# Patient Record
Sex: MALE | Race: OTHER | NOT HISPANIC OR LATINO | URBAN - METROPOLITAN AREA
[De-identification: names, ages, dates, MRNs, and addresses within clinical notes are randomized per-mention and may not be internally consistent; named-entity substitution may affect disease eponyms.]

---

## 2023-07-01 ENCOUNTER — EMERGENCY (EMERGENCY)
Facility: HOSPITAL | Age: 53
LOS: 1 days | Discharge: HOME | End: 2023-07-01
Attending: EMERGENCY MEDICINE
Payer: MEDICAID

## 2023-07-01 VITALS
DIASTOLIC BLOOD PRESSURE: 79 MMHG | TEMPERATURE: 99 F | HEART RATE: 81 BPM | OXYGEN SATURATION: 98 % | RESPIRATION RATE: 19 BRPM | SYSTOLIC BLOOD PRESSURE: 114 MMHG

## 2023-07-01 VITALS
OXYGEN SATURATION: 98 % | DIASTOLIC BLOOD PRESSURE: 93 MMHG | RESPIRATION RATE: 18 BRPM | WEIGHT: 125 LBS | HEIGHT: 69 IN | SYSTOLIC BLOOD PRESSURE: 148 MMHG | TEMPERATURE: 98 F | HEART RATE: 104 BPM

## 2023-07-01 LAB
ALBUMIN SERPL ELPH-MCNC: 4.7 G/DL — SIGNIFICANT CHANGE UP (ref 3.3–5)
ALP SERPL-CCNC: 68 U/L — SIGNIFICANT CHANGE UP (ref 40–120)
ALT FLD-CCNC: 53 U/L — HIGH (ref 10–45)
ANION GAP SERPL CALC-SCNC: 12 MMOL/L — SIGNIFICANT CHANGE UP (ref 5–17)
AST SERPL-CCNC: 53 U/L — HIGH (ref 10–40)
BASOPHILS # BLD AUTO: 0.03 K/UL — SIGNIFICANT CHANGE UP (ref 0–0.2)
BASOPHILS NFR BLD AUTO: 0.6 % — SIGNIFICANT CHANGE UP (ref 0–2)
BILIRUB SERPL-MCNC: 0.8 MG/DL — SIGNIFICANT CHANGE UP (ref 0.2–1.2)
BUN SERPL-MCNC: 10 MG/DL — SIGNIFICANT CHANGE UP (ref 7–23)
CALCIUM SERPL-MCNC: 10 MG/DL — SIGNIFICANT CHANGE UP (ref 8.4–10.5)
CHLORIDE SERPL-SCNC: 99 MMOL/L — SIGNIFICANT CHANGE UP (ref 96–108)
CO2 SERPL-SCNC: 28 MMOL/L — SIGNIFICANT CHANGE UP (ref 22–31)
CREAT SERPL-MCNC: 0.85 MG/DL — SIGNIFICANT CHANGE UP (ref 0.5–1.3)
EGFR: 105 ML/MIN/1.73M2 — SIGNIFICANT CHANGE UP
EOSINOPHIL # BLD AUTO: 0.15 K/UL — SIGNIFICANT CHANGE UP (ref 0–0.5)
EOSINOPHIL NFR BLD AUTO: 3.2 % — SIGNIFICANT CHANGE UP (ref 0–6)
GLUCOSE SERPL-MCNC: 89 MG/DL — SIGNIFICANT CHANGE UP (ref 70–99)
HCT VFR BLD CALC: 42 % — SIGNIFICANT CHANGE UP (ref 39–50)
HGB BLD-MCNC: 14.6 G/DL — SIGNIFICANT CHANGE UP (ref 13–17)
IMM GRANULOCYTES NFR BLD AUTO: 0.2 % — SIGNIFICANT CHANGE UP (ref 0–0.9)
LYMPHOCYTES # BLD AUTO: 1.37 K/UL — SIGNIFICANT CHANGE UP (ref 1–3.3)
LYMPHOCYTES # BLD AUTO: 29 % — SIGNIFICANT CHANGE UP (ref 13–44)
MCHC RBC-ENTMCNC: 31.1 PG — SIGNIFICANT CHANGE UP (ref 27–34)
MCHC RBC-ENTMCNC: 34.8 GM/DL — SIGNIFICANT CHANGE UP (ref 32–36)
MCV RBC AUTO: 89.4 FL — SIGNIFICANT CHANGE UP (ref 80–100)
MONOCYTES # BLD AUTO: 0.63 K/UL — SIGNIFICANT CHANGE UP (ref 0–0.9)
MONOCYTES NFR BLD AUTO: 13.3 % — SIGNIFICANT CHANGE UP (ref 2–14)
NEUTROPHILS # BLD AUTO: 2.53 K/UL — SIGNIFICANT CHANGE UP (ref 1.8–7.4)
NEUTROPHILS NFR BLD AUTO: 53.7 % — SIGNIFICANT CHANGE UP (ref 43–77)
NRBC # BLD: 0 /100 WBCS — SIGNIFICANT CHANGE UP (ref 0–0)
PLATELET # BLD AUTO: 335 K/UL — SIGNIFICANT CHANGE UP (ref 150–400)
POTASSIUM SERPL-MCNC: 4.3 MMOL/L — SIGNIFICANT CHANGE UP (ref 3.5–5.3)
POTASSIUM SERPL-SCNC: 4.3 MMOL/L — SIGNIFICANT CHANGE UP (ref 3.5–5.3)
PROT SERPL-MCNC: 7.4 G/DL — SIGNIFICANT CHANGE UP (ref 6–8.3)
RBC # BLD: 4.7 M/UL — SIGNIFICANT CHANGE UP (ref 4.2–5.8)
RBC # FLD: 12.4 % — SIGNIFICANT CHANGE UP (ref 10.3–14.5)
SODIUM SERPL-SCNC: 139 MMOL/L — SIGNIFICANT CHANGE UP (ref 135–145)
WBC # BLD: 4.72 K/UL — SIGNIFICANT CHANGE UP (ref 3.8–10.5)
WBC # FLD AUTO: 4.72 K/UL — SIGNIFICANT CHANGE UP (ref 3.8–10.5)

## 2023-07-01 PROCEDURE — 85025 COMPLETE CBC W/AUTO DIFF WBC: CPT

## 2023-07-01 PROCEDURE — 80053 COMPREHEN METABOLIC PANEL: CPT

## 2023-07-01 PROCEDURE — 73206 CT ANGIO UPR EXTRM W/O&W/DYE: CPT | Mod: MA

## 2023-07-01 PROCEDURE — 99285 EMERGENCY DEPT VISIT HI MDM: CPT

## 2023-07-01 PROCEDURE — 99284 EMERGENCY DEPT VISIT MOD MDM: CPT | Mod: 25

## 2023-07-01 PROCEDURE — 73206 CT ANGIO UPR EXTRM W/O&W/DYE: CPT | Mod: 26,RT,MA

## 2023-07-01 PROCEDURE — 36415 COLL VENOUS BLD VENIPUNCTURE: CPT

## 2023-07-01 NOTE — ED PROVIDER NOTE - NSFOLLOWUPCLINICS_GEN_ALL_ED_FT
[facility] Dermatol[facility]  Dermatology  [address], [facility] [address]  [place] [address]  Phone: [phone]  Fax:     [facility]  General Surgery  [address] [place] [address]  Phone: [phone]  Fax:

## 2023-07-01 NOTE — ED ADULT NURSE NOTE - OBJECTIVE STATEMENT
Patient  is  alert  and  oriented x3.  Color is good and skin warm to touch. Redness, swelling and hematoma noted to  left inner forearm.  Patient  denies any fall or injury.

## 2023-07-01 NOTE — ED PROVIDER NOTE - PROGRESS NOTE DETAILS
Salome Horne, PGY-2 DO:  CT concerning for possibly cystic /solid mass. Patient informed of findings. Patient to be d/c'd with f/u with derm and surgery. Patient is agreeable to plan. Patient instructed to take Tylenol and Motrin for symptoms.

## 2023-07-01 NOTE — ED PROVIDER NOTE - OBJECTIVE STATEMENT
52-year-old male no known medical history presenting with right forearm mass.  Patient states he noticed mass for about 1 year.  Started off small but has gradually been increasing.  Patient started getting pain 1 week ago.  Patient states he is having pain radiating down the right forearm and up the right arm.  Patient states pain is constant.  Has tried Tylenol with minimal symptom relief.  Patient describes pain as achy.  States he has tingling in his finger and occasionally gets twitches  of the forearm.  The patient denies any trauma to the region.  Patient denies nausea, vomiting, fevers, chills, chest pain, shortness of breath, headache or visual changes.

## 2023-07-01 NOTE — ED PROVIDER NOTE - ATTENDING CONTRIBUTION TO CARE
RGUJRAL 52-year-old male no past medical history presents with right forearm mass.  Patient states he has noticed a small lesion for about a year that has grown since.  Denies any trauma or drug abuse.  Patient states recently his pain now radiating down and up to his arm with pain and intermittent tingling.  Patient has not been evaluated for this lesion prior.  On exam patient is well-appearing no acute distress nontender shoulder or elbow.  Right forearm volar aspect proximal forearm with 5 x 5 cm raised questionable cystic lesion no overlying cellulitis or erythema non tender.  Positive radial pulse normal sensation.  5 out of 5 strength.  Will obtain ultrasound and CAT scan to evaluate mass.  Patient declined pain meds.  Continue to monitor.

## 2023-07-01 NOTE — ED PROVIDER NOTE - NSFOLLOWUPINSTRUCTIONS_ED_ALL_ED_FT
Today you were evaluated for a mass on your right forearm. The CT scans were inconclusive on the specific type of mass so you will need to follow up with another physician for further evaluation.     Please follow up with a dermatologist or general surgeon within the next week.     Return to the ER for any new or concerning symptoms such as mass getting larger, pain that is not controlled with medication or loss of sensation in the arm or hand.     You may take 650 mg acetaminophen every eight hours or 600 mg Motrin every six hours as needed for pain.

## 2023-07-01 NOTE — ED ADULT NURSE NOTE - NURSING MUSC JOINTS
Quality 226: Preventive Care And Screening: Tobacco Use: Screening And Cessation Intervention: Tobacco Screening not Performed for Unknown Reasons
no pain, swelling or deformity of joints
Detail Level: Detailed

## 2023-07-01 NOTE — ED ADULT NURSE NOTE - NSFALLUNIVINTERV_ED_ALL_ED
Bed/Stretcher in lowest position, wheels locked, appropriate side rails in place/Call bell, personal items and telephone in reach/Instruct patient to call for assistance before getting out of bed/chair/stretcher/Non-slip footwear applied when patient is off stretcher/[place] to call system/Physically safe environment - no spills, clutter or unnecessary equipment/Purposeful proactive rounding/Room/bathroom lighting operational, light cord in reach

## 2023-07-01 NOTE — ED PROVIDER NOTE - CLINICAL SUMMARY MEDICAL DECISION MAKING FREE TEXT BOX
Differential is not limited to vascular malformation, complex cyst, lipoma, malignant mass low concern for abscess and cellulitis.  Plan to get CTA of right arm.  Patient is denying pain control at this time.  Dispo pending imaging and reassessment.

## 2023-07-01 NOTE — ED PROVIDER NOTE - PATIENT PORTAL LINK FT
You can access the FollowMyHealth Patient Portal offered by [facility] by registering at the following website: [email or web address]. By joining [employer]’s FollowMyHealth portal, you will also be able to view your health information using other applications (apps) compatible with our system.

## 2023-07-01 NOTE — ED PROVIDER NOTE - PHYSICAL EXAMINATION
GENERAL: Awake, alert, NAD  HEENT: NC/AT, moist mucous membranes, PERRL, EOMI  LUNGS: CTAB, no wheezes or crackles   CARDIAC: RRR, no m/r/g  ABDOMEN: Soft, non tender, non distended, no rebound, no guarding  BACK: No midline spinal tenderness, no CVA tenderness  EXT: No edema, no calf tenderness, 2+ DP pulses bilaterally, no deformities.  NEURO: A&Ox3. Moving all extremities.  SKIN: +right forearm mass, TTP of mass, Mobile, soft, vascular and elevated ~3cm. Not Warm or erythematous.   PSYCH: Normal affect.

## 2023-07-06 PROBLEM — Z00.00 ENCOUNTER FOR PREVENTIVE HEALTH EXAMINATION: Status: ACTIVE | Noted: 2023-07-06

## 2023-07-10 ENCOUNTER — APPOINTMENT (OUTPATIENT)
Dept: SURGERY | Facility: HOSPITAL | Age: 53
End: 2023-07-10
Payer: MEDICAID

## 2023-07-10 ENCOUNTER — OUTPATIENT (OUTPATIENT)
Dept: OUTPATIENT SERVICES | Facility: HOSPITAL | Age: 53
LOS: 1 days | End: 2023-07-10
Payer: MEDICAID

## 2023-07-10 VITALS
DIASTOLIC BLOOD PRESSURE: 86 MMHG | TEMPERATURE: 96.8 F | BODY MASS INDEX: 17.03 KG/M2 | WEIGHT: 115 LBS | SYSTOLIC BLOOD PRESSURE: 126 MMHG | HEART RATE: 73 BPM | HEIGHT: 69 IN

## 2023-07-10 DIAGNOSIS — L72.9 FOLLICULAR CYST OF THE SKIN AND SUBCUTANEOUS TISSUE, UNSPECIFIED: ICD-10-CM

## 2023-07-10 DIAGNOSIS — R22.31 LOCALIZED SWELLING, MASS AND LUMP, RIGHT UPPER LIMB: ICD-10-CM

## 2023-07-10 DIAGNOSIS — L72.3 SEBACEOUS CYST: ICD-10-CM

## 2023-07-10 PROCEDURE — 99202 OFFICE O/P NEW SF 15 MIN: CPT

## 2023-07-10 PROCEDURE — G0463: CPT

## 2023-08-12 ENCOUNTER — OUTPATIENT (OUTPATIENT)
Dept: OUTPATIENT SERVICES | Facility: HOSPITAL | Age: 53
LOS: 1 days | End: 2023-08-12

## 2023-08-12 DIAGNOSIS — Z00.8 ENCOUNTER FOR OTHER GENERAL EXAMINATION: ICD-10-CM

## 2023-09-17 ENCOUNTER — APPOINTMENT (OUTPATIENT)
Dept: MRI IMAGING | Facility: IMAGING CENTER | Age: 53
End: 2023-09-17

## 2023-09-17 ENCOUNTER — OUTPATIENT (OUTPATIENT)
Dept: OUTPATIENT SERVICES | Facility: HOSPITAL | Age: 53
LOS: 1 days | End: 2023-09-17

## 2023-09-17 DIAGNOSIS — Z00.8 ENCOUNTER FOR OTHER GENERAL EXAMINATION: ICD-10-CM

## 2023-09-17 DIAGNOSIS — L72.9 FOLLICULAR CYST OF THE SKIN AND SUBCUTANEOUS TISSUE, UNSPECIFIED: ICD-10-CM

## 2024-02-25 ENCOUNTER — EMERGENCY (EMERGENCY)
Facility: HOSPITAL | Age: 54
LOS: 1 days | Discharge: HOME | End: 2024-02-25
Attending: EMERGENCY MEDICINE
Payer: COMMERCIAL

## 2024-02-25 VITALS
OXYGEN SATURATION: 97 % | SYSTOLIC BLOOD PRESSURE: 125 MMHG | RESPIRATION RATE: 16 BRPM | DIASTOLIC BLOOD PRESSURE: 86 MMHG | HEART RATE: 92 BPM | TEMPERATURE: 98 F

## 2024-02-25 VITALS
RESPIRATION RATE: 16 BRPM | OXYGEN SATURATION: 97 % | DIASTOLIC BLOOD PRESSURE: 88 MMHG | HEART RATE: 120 BPM | TEMPERATURE: 98 F | HEIGHT: 69 IN | SYSTOLIC BLOOD PRESSURE: 137 MMHG | WEIGHT: 119.93 LBS

## 2024-02-25 LAB
ALBUMIN SERPL ELPH-MCNC: 4.3 G/DL — SIGNIFICANT CHANGE UP (ref 3.3–5)
ALP SERPL-CCNC: 74 U/L — SIGNIFICANT CHANGE UP (ref 40–120)
ALT FLD-CCNC: 26 U/L — SIGNIFICANT CHANGE UP (ref 10–45)
ANION GAP SERPL CALC-SCNC: 10 MMOL/L — SIGNIFICANT CHANGE UP (ref 5–17)
APTT BLD: 28.6 SEC — SIGNIFICANT CHANGE UP (ref 24.5–35.6)
AST SERPL-CCNC: 30 U/L — SIGNIFICANT CHANGE UP (ref 10–40)
BASOPHILS # BLD AUTO: 0.03 K/UL — SIGNIFICANT CHANGE UP (ref 0–0.2)
BASOPHILS NFR BLD AUTO: 0.7 % — SIGNIFICANT CHANGE UP (ref 0–2)
BILIRUB SERPL-MCNC: 0.4 MG/DL — SIGNIFICANT CHANGE UP (ref 0.2–1.2)
BUN SERPL-MCNC: 7 MG/DL — SIGNIFICANT CHANGE UP (ref 7–23)
CALCIUM SERPL-MCNC: 9.2 MG/DL — SIGNIFICANT CHANGE UP (ref 8.4–10.5)
CHLORIDE SERPL-SCNC: 102 MMOL/L — SIGNIFICANT CHANGE UP (ref 96–108)
CK MB CFR SERPL CALC: <1 NG/ML — SIGNIFICANT CHANGE UP (ref 0–6.7)
CK SERPL-CCNC: 77 U/L — SIGNIFICANT CHANGE UP (ref 30–200)
CO2 SERPL-SCNC: 27 MMOL/L — SIGNIFICANT CHANGE UP (ref 22–31)
CREAT SERPL-MCNC: 0.78 MG/DL — SIGNIFICANT CHANGE UP (ref 0.5–1.3)
D DIMER BLD IA.RAPID-MCNC: <150 NG/ML DDU — SIGNIFICANT CHANGE UP
EGFR: 107 ML/MIN/1.73M2 — SIGNIFICANT CHANGE UP
EOSINOPHIL # BLD AUTO: 0.04 K/UL — SIGNIFICANT CHANGE UP (ref 0–0.5)
EOSINOPHIL NFR BLD AUTO: 0.9 % — SIGNIFICANT CHANGE UP (ref 0–6)
FLUAV AG NPH QL: SIGNIFICANT CHANGE UP
FLUBV AG NPH QL: SIGNIFICANT CHANGE UP
GAS PNL BLDV: SIGNIFICANT CHANGE UP
GLUCOSE SERPL-MCNC: 126 MG/DL — HIGH (ref 70–99)
HCT VFR BLD CALC: 40.6 % — SIGNIFICANT CHANGE UP (ref 39–50)
HGB BLD-MCNC: 14.2 G/DL — SIGNIFICANT CHANGE UP (ref 13–17)
IMM GRANULOCYTES NFR BLD AUTO: 0.2 % — SIGNIFICANT CHANGE UP (ref 0–0.9)
INR BLD: 0.88 RATIO — SIGNIFICANT CHANGE UP (ref 0.85–1.18)
LYMPHOCYTES # BLD AUTO: 1.85 K/UL — SIGNIFICANT CHANGE UP (ref 1–3.3)
LYMPHOCYTES # BLD AUTO: 40.9 % — SIGNIFICANT CHANGE UP (ref 13–44)
MAGNESIUM SERPL-MCNC: 1.8 MG/DL — SIGNIFICANT CHANGE UP (ref 1.6–2.6)
MCHC RBC-ENTMCNC: 30.9 PG — SIGNIFICANT CHANGE UP (ref 27–34)
MCHC RBC-ENTMCNC: 35 GM/DL — SIGNIFICANT CHANGE UP (ref 32–36)
MCV RBC AUTO: 88.5 FL — SIGNIFICANT CHANGE UP (ref 80–100)
MONOCYTES # BLD AUTO: 0.55 K/UL — SIGNIFICANT CHANGE UP (ref 0–0.9)
MONOCYTES NFR BLD AUTO: 12.2 % — SIGNIFICANT CHANGE UP (ref 2–14)
NEUTROPHILS # BLD AUTO: 2.04 K/UL — SIGNIFICANT CHANGE UP (ref 1.8–7.4)
NEUTROPHILS NFR BLD AUTO: 45.1 % — SIGNIFICANT CHANGE UP (ref 43–77)
NRBC # BLD: 0 /100 WBCS — SIGNIFICANT CHANGE UP (ref 0–0)
NT-PROBNP SERPL-SCNC: <36 PG/ML — SIGNIFICANT CHANGE UP (ref 0–300)
PLATELET # BLD AUTO: 316 K/UL — SIGNIFICANT CHANGE UP (ref 150–400)
POTASSIUM SERPL-MCNC: 3.4 MMOL/L — LOW (ref 3.5–5.3)
POTASSIUM SERPL-SCNC: 3.4 MMOL/L — LOW (ref 3.5–5.3)
PROT SERPL-MCNC: 6.9 G/DL — SIGNIFICANT CHANGE UP (ref 6–8.3)
PROTHROM AB SERPL-ACNC: 9.7 SEC — SIGNIFICANT CHANGE UP (ref 9.5–13)
RBC # BLD: 4.59 M/UL — SIGNIFICANT CHANGE UP (ref 4.2–5.8)
RBC # FLD: 12.9 % — SIGNIFICANT CHANGE UP (ref 10.3–14.5)
RSV RNA NPH QL NAA+NON-PROBE: SIGNIFICANT CHANGE UP
SARS-COV-2 RNA SPEC QL NAA+PROBE: SIGNIFICANT CHANGE UP
SODIUM SERPL-SCNC: 139 MMOL/L — SIGNIFICANT CHANGE UP (ref 135–145)
TROPONIN T, HIGH SENSITIVITY RESULT: <6 NG/L — SIGNIFICANT CHANGE UP (ref 0–51)
TSH SERPL-MCNC: 0.55 UIU/ML — SIGNIFICANT CHANGE UP (ref 0.27–4.2)
WBC # BLD: 4.52 K/UL — SIGNIFICANT CHANGE UP (ref 3.8–10.5)
WBC # FLD AUTO: 4.52 K/UL — SIGNIFICANT CHANGE UP (ref 3.8–10.5)

## 2024-02-25 PROCEDURE — 83735 ASSAY OF MAGNESIUM: CPT

## 2024-02-25 PROCEDURE — 85730 THROMBOPLASTIN TIME PARTIAL: CPT

## 2024-02-25 PROCEDURE — 99285 EMERGENCY DEPT VISIT HI MDM: CPT | Mod: 25

## 2024-02-25 PROCEDURE — 80053 COMPREHEN METABOLIC PANEL: CPT

## 2024-02-25 PROCEDURE — 84132 ASSAY OF SERUM POTASSIUM: CPT

## 2024-02-25 PROCEDURE — 82947 ASSAY GLUCOSE BLOOD QUANT: CPT

## 2024-02-25 PROCEDURE — 82550 ASSAY OF CK (CPK): CPT

## 2024-02-25 PROCEDURE — 82330 ASSAY OF CALCIUM: CPT

## 2024-02-25 PROCEDURE — 87637 SARSCOV2&INF A&B&RSV AMP PRB: CPT

## 2024-02-25 PROCEDURE — 85025 COMPLETE CBC W/AUTO DIFF WBC: CPT

## 2024-02-25 PROCEDURE — 71046 X-RAY EXAM CHEST 2 VIEWS: CPT | Mod: 26

## 2024-02-25 PROCEDURE — 84295 ASSAY OF SERUM SODIUM: CPT

## 2024-02-25 PROCEDURE — 71046 X-RAY EXAM CHEST 2 VIEWS: CPT

## 2024-02-25 PROCEDURE — 93005 ELECTROCARDIOGRAM TRACING: CPT

## 2024-02-25 PROCEDURE — 84443 ASSAY THYROID STIM HORMONE: CPT

## 2024-02-25 PROCEDURE — 83605 ASSAY OF LACTIC ACID: CPT

## 2024-02-25 PROCEDURE — 82435 ASSAY OF BLOOD CHLORIDE: CPT

## 2024-02-25 PROCEDURE — 85610 PROTHROMBIN TIME: CPT

## 2024-02-25 PROCEDURE — 82553 CREATINE MB FRACTION: CPT

## 2024-02-25 PROCEDURE — 94640 AIRWAY INHALATION TREATMENT: CPT

## 2024-02-25 PROCEDURE — 84484 ASSAY OF TROPONIN QUANT: CPT

## 2024-02-25 PROCEDURE — 82803 BLOOD GASES ANY COMBINATION: CPT

## 2024-02-25 PROCEDURE — 85018 HEMOGLOBIN: CPT

## 2024-02-25 PROCEDURE — 85014 HEMATOCRIT: CPT

## 2024-02-25 PROCEDURE — 99285 EMERGENCY DEPT VISIT HI MDM: CPT

## 2024-02-25 PROCEDURE — 85379 FIBRIN DEGRADATION QUANT: CPT

## 2024-02-25 PROCEDURE — 83880 ASSAY OF NATRIURETIC PEPTIDE: CPT

## 2024-02-25 RX ORDER — ALBUTEROL 90 UG/1
2 AEROSOL, METERED ORAL
Qty: 1 | Refills: 0
Start: 2024-02-25

## 2024-02-25 RX ORDER — IPRATROPIUM/ALBUTEROL SULFATE 18-103MCG
3 AEROSOL WITH ADAPTER (GRAM) INHALATION ONCE
Refills: 0 | Status: COMPLETED | OUTPATIENT
Start: 2024-02-25 | End: 2024-02-25

## 2024-02-25 RX ORDER — POTASSIUM CHLORIDE 20 MEQ
40 PACKET (EA) ORAL ONCE
Refills: 0 | Status: COMPLETED | OUTPATIENT
Start: 2024-02-25 | End: 2024-02-25

## 2024-02-25 RX ORDER — SODIUM CHLORIDE 9 MG/ML
500 INJECTION INTRAMUSCULAR; INTRAVENOUS; SUBCUTANEOUS ONCE
Refills: 0 | Status: COMPLETED | OUTPATIENT
Start: 2024-02-25 | End: 2024-02-25

## 2024-02-25 RX ADMIN — Medication 3 MILLILITER(S): at 15:00

## 2024-02-25 RX ADMIN — Medication 25 MILLIGRAM(S): at 15:05

## 2024-02-25 RX ADMIN — SODIUM CHLORIDE 500 MILLILITER(S): 9 INJECTION INTRAMUSCULAR; INTRAVENOUS; SUBCUTANEOUS at 13:21

## 2024-02-25 RX ADMIN — Medication 40 MILLIEQUIVALENT(S): at 15:00

## 2024-02-25 RX ADMIN — SODIUM CHLORIDE 500 MILLILITER(S): 9 INJECTION INTRAMUSCULAR; INTRAVENOUS; SUBCUTANEOUS at 13:43

## 2024-02-25 NOTE — ED PROVIDER NOTE - NSFOLLOWUPCLINICSTOKEN_GEN_ALL_ED_FT
174056: || ||00\01||False;[id number]: || ||00\01||False;[id number]: || ||00\01||False;691100: || ||00\01||False;

## 2024-02-25 NOTE — ED PROVIDER NOTE - NSFOLLOWUPCLINICS_GEN_ALL_ED_FT
[facility] Pulmonolgy and Sleep Medicine  Pulmonology  [address]  [place] [address]  Phone: [phone]  Fax:     [facility] [place]  Dermatology  [address]  [place] [address]  Phone: [phone]  Fax: [phone]    [facility] General Internal Medicine  General Internal Medicine  [address] [place] [address]  Phone: [phone]  Fax:     [facility] at [place]  Internal Medicine  [address] [place] [address]  Phone: [phone]  Fax: [phone]

## 2024-02-25 NOTE — ED PROVIDER NOTE - CLINICAL SUMMARY MEDICAL DECISION MAKING FREE TEXT BOX
Sanjay PGY3: 54yo M with no PCP, PMH active smoker (cig/marijuana) & alcohol use presents for eval of 'heart racing' & SOB. Differential Diagnosis includes but not limited to alcohol WD vs infectious causes including PNA/viral URI vs substance use vs underlying lung dx due to smoking. No known cardiac hx or PE risk factors. Given EKG shows sinus tachycardia with no measured fever will plan to check labs, trop/bnp, d-dimer, CXR, give gentle IVF and trend fever curve. Dispo based on results.

## 2024-02-25 NOTE — ED ADULT NURSE NOTE - OBJECTIVE STATEMENT
53y M BIB self from home p/w shortness of breath. Pt is axo4, ambulates independently at baseline. PMH active smoker (cig/marijuana) & alcohol use (vodka as liquor preference). Mentions the shortness of breath is associated with "heart racing" feeling for the last 3 days, has intermittently felt his heart race and then felt chest get tight w/ mild discomfort. Pain doesn't radiate anywhere. Denies headache, dizziness, vision changes, abdominal pain, nausea, vomiting, diarrhea, fevers, chills, dysuria, hematuria, recent illness travel or fall. Denies drug use, +alcohol 1/2 pint vodka 3-4/week. No hx of WD. Last drink 2/24 PM. Patient undressed and placed into gown, call bell in hand and side rails up with bed in lowest position for safety. blanket provided. Comfort and safety provided. Placed on cardiac monitor, sinus tachycardia.

## 2024-02-25 NOTE — ED PROVIDER NOTE - PHYSICAL EXAMINATION
CONSTITUTIONAL: thin patient, appears older than stated age, NAD.  SKIN: no rash appreciated, warm to touch. ; 1.5cm ovoid soft mobile lesion with punctate top over flexor R arm @ elbow w/o and palpable fluctuance and unable to express any materials.   HEAD: NCAT  EYES: NL inspection  ENT: mildly dry oral mucosa, mild tongue fasciculations   NECK: Supple  CARD: tachycardia, regular   RESP: lungs CTA w/o wheezing/crackles   ABD: S/NT no R/G  EXT: no LE edema  NEURO: Grossly non-focal   PSYCH: Cooperative, appropriate.

## 2024-02-25 NOTE — ED PROVIDER NOTE - OBJECTIVE STATEMENT
52yo M with no PCP, PMH active smoker (cig/marijuana) & alcohol use presents for eval of 'heart racing' & SOB. For 3d has intermittently felt his heart race and then felt chest get tight w/ mild discomfort. +orthopnea, mild nausea and dec exercise tolerance. Pain doesn't radiate anywhere. No significant cough, fever, abd pain, vomiting/diarrhea. Denies drug use, +alcohol 1/2 pint vodka 3-4/week. No hx of WD. Last drink 2/24 PM. No known lung/cardiac dx.

## 2024-02-25 NOTE — ED ADULT NURSE REASSESSMENT NOTE - NS ED NURSE REASSESS COMMENT FT1
1512 Pt given potasium as ordered pot 3.4 Pt also given Librium Pt has no active withdrawal symptoms at this time HHN   also  Delroy

## 2024-02-25 NOTE — ED PROVIDER NOTE - PATIENT PORTAL LINK FT
You can access the FollowMyHealth Patient Portal offered by [facility] by registering at the following website: [email or web address]. By joining [facility]’s FollowMyHealth portal, you will also be able to view your health information using other applications (apps) compatible with our system.

## 2024-02-25 NOTE — ED PROVIDER NOTE - ATTENDING CONTRIBUTION TO CARE
52 yo male, hx of heavy ETOH use (>14 drinks/week), active smoker, p/w intermittment palpitations/SOB.  nontoxic on exam, conversant.  EKG independently reviewed by me at the bedside, no evidence of STEMI, sinus tach noted.  chest x-ray independently reviewed by myself, no evidence of pneumothorax, mediastinum appears normal, lungs clear.  dimer negative, troponin negative.  not PE, not ACS, not dissection.  will give nebs and reassess.

## 2024-02-25 NOTE — ED PROVIDER NOTE - PROGRESS NOTE DETAILS
Sanjay PGY3: reassessed pt, HR now 80. Results non-contributory for cause of pt sxs. Pt palpitations slowed but still feels the SOB w/o change. Plan for duoneb, librium and potassium repletion and tbdc with pulm f/u. Patient agreeable to this plan. Sanjay PGY3: after neb, patient feels improved. Possibly COPD. Will dc with albuterol, 5d of steroids and pulm f/u.

## 2024-02-25 NOTE — ED PROVIDER NOTE - NSFOLLOWUPINSTRUCTIONS_ED_ALL_ED_FT
Shortness of breath    Shortness of breath (dyspnea) means you have trouble breathing and could indicate a medical problem. Causes include lung disease, heart disease, low amount of red blood cells (anemia), poor physical fitness, being overweight, smoking, etc. Your health care provider today may not be able to find a cause for your shortness of breath after your exam. In this case, it is important to have a follow-up exam with your primary care physician as instructed. Refrain from tobacco products.    - Please follow-up with a PULMONOLOGIST.    SEEK IMMEDIATE MEDICAL CARE IF YOU HAVE ANY OF THE FOLLOWING SYMPTOMS: worsening shortness of breath, chest pain, back pain, abdominal pain, fever, coughing up blood, lightheadedness/dizziness. Shortness of breath    Shortness of breath (dyspnea) means you have trouble breathing and could indicate a medical problem. Causes include lung disease, heart disease, low amount of red blood cells (anemia), poor physical fitness, being overweight, smoking, etc. Your health care provider today may not be able to find a cause for your shortness of breath after your exam. In this case, it is important to have a follow-up exam with your primary care physician as instructed. Refrain from tobacco products.    - Continue with albuterol 2 puffs every 6 hours as needed for chest tightness/shortness of breath.   - Continue with Prednisone 40mg once a day with a meal for next 5 days.    - Please follow-up with a PULMONOLOGIST.  - Please follow up with a PRIMARY CARE PROVIDER & still recommend Dermatology follow-up for cystic lesion on arm.     SEEK IMMEDIATE MEDICAL CARE IF YOU HAVE ANY OF THE FOLLOWING SYMPTOMS: worsening shortness of breath, chest pain, back pain, abdominal pain, fever, coughing up blood, lightheadedness/dizziness.

## 2024-02-25 NOTE — ED ADULT NURSE NOTE - NSFALLUNIVINTERV_ED_ALL_ED
Bed/Stretcher in lowest position, wheels locked, appropriate side rails in place/Call bell, personal items and telephone in reach/Instruct patient to call for assistance before getting out of bed/chair/stretcher/Non-slip footwear applied when patient is off stretcher/[place] to call system/Physically safe environment - no spills, clutter or unnecessary equipment/Purposeful proactive rounding/Room/bathroom lighting operational, light cord in reach

## 2024-05-17 ENCOUNTER — INPATIENT (INPATIENT)
Facility: HOSPITAL | Age: 54
LOS: 3 days | Discharge: HOME | DRG: 897 | End: 2024-05-21
Attending: STUDENT IN AN ORGANIZED HEALTH CARE EDUCATION/TRAINING PROGRAM | Admitting: STUDENT IN AN ORGANIZED HEALTH CARE EDUCATION/TRAINING PROGRAM
Payer: MEDICAID

## 2024-05-17 VITALS
OXYGEN SATURATION: 97 % | HEART RATE: 104 BPM | HEIGHT: 69 IN | RESPIRATION RATE: 18 BRPM | WEIGHT: 125 LBS | SYSTOLIC BLOOD PRESSURE: 145 MMHG | DIASTOLIC BLOOD PRESSURE: 97 MMHG | TEMPERATURE: 98 F

## 2024-05-17 LAB
ALBUMIN SERPL ELPH-MCNC: 4.6 G/DL — SIGNIFICANT CHANGE UP (ref 3.3–5)
ALP SERPL-CCNC: 63 U/L — SIGNIFICANT CHANGE UP (ref 40–120)
ALT FLD-CCNC: 30 U/L — SIGNIFICANT CHANGE UP (ref 10–45)
ANION GAP SERPL CALC-SCNC: 18 MMOL/L — HIGH (ref 5–17)
APAP SERPL-MCNC: <15 UG/ML — SIGNIFICANT CHANGE UP (ref 10–30)
AST SERPL-CCNC: 24 U/L — SIGNIFICANT CHANGE UP (ref 10–40)
BASOPHILS # BLD AUTO: 0.04 K/UL — SIGNIFICANT CHANGE UP (ref 0–0.2)
BASOPHILS NFR BLD AUTO: 0.8 % — SIGNIFICANT CHANGE UP (ref 0–2)
BILIRUB SERPL-MCNC: 0.3 MG/DL — SIGNIFICANT CHANGE UP (ref 0.2–1.2)
BUN SERPL-MCNC: 7 MG/DL — SIGNIFICANT CHANGE UP (ref 7–23)
CALCIUM SERPL-MCNC: 9.5 MG/DL — SIGNIFICANT CHANGE UP (ref 8.4–10.5)
CHLORIDE SERPL-SCNC: 104 MMOL/L — SIGNIFICANT CHANGE UP (ref 96–108)
CO2 SERPL-SCNC: 22 MMOL/L — SIGNIFICANT CHANGE UP (ref 22–31)
CREAT SERPL-MCNC: 0.81 MG/DL — SIGNIFICANT CHANGE UP (ref 0.5–1.3)
EGFR: 106 ML/MIN/1.73M2 — SIGNIFICANT CHANGE UP
EOSINOPHIL # BLD AUTO: 0.06 K/UL — SIGNIFICANT CHANGE UP (ref 0–0.5)
EOSINOPHIL NFR BLD AUTO: 1.3 % — SIGNIFICANT CHANGE UP (ref 0–6)
ETHANOL SERPL-MCNC: 279 MG/DL — HIGH (ref 0–10)
GLUCOSE SERPL-MCNC: 83 MG/DL — SIGNIFICANT CHANGE UP (ref 70–99)
HCT VFR BLD CALC: 48 % — SIGNIFICANT CHANGE UP (ref 39–50)
HGB BLD-MCNC: 16.6 G/DL — SIGNIFICANT CHANGE UP (ref 13–17)
HIV 1 & 2 AB SERPL IA.RAPID: SIGNIFICANT CHANGE UP
IMM GRANULOCYTES NFR BLD AUTO: 0.2 % — SIGNIFICANT CHANGE UP (ref 0–0.9)
LYMPHOCYTES # BLD AUTO: 2.07 K/UL — SIGNIFICANT CHANGE UP (ref 1–3.3)
LYMPHOCYTES # BLD AUTO: 43.1 % — SIGNIFICANT CHANGE UP (ref 13–44)
MAGNESIUM SERPL-MCNC: 2.2 MG/DL — SIGNIFICANT CHANGE UP (ref 1.6–2.6)
MCHC RBC-ENTMCNC: 31.1 PG — SIGNIFICANT CHANGE UP (ref 27–34)
MCHC RBC-ENTMCNC: 34.6 GM/DL — SIGNIFICANT CHANGE UP (ref 32–36)
MCV RBC AUTO: 90.1 FL — SIGNIFICANT CHANGE UP (ref 80–100)
MONOCYTES # BLD AUTO: 0.46 K/UL — SIGNIFICANT CHANGE UP (ref 0–0.9)
MONOCYTES NFR BLD AUTO: 9.6 % — SIGNIFICANT CHANGE UP (ref 2–14)
NEUTROPHILS # BLD AUTO: 2.16 K/UL — SIGNIFICANT CHANGE UP (ref 1.8–7.4)
NEUTROPHILS NFR BLD AUTO: 45 % — SIGNIFICANT CHANGE UP (ref 43–77)
NRBC # BLD: 0 /100 WBCS — SIGNIFICANT CHANGE UP (ref 0–0)
PHOSPHATE SERPL-MCNC: 3.6 MG/DL — SIGNIFICANT CHANGE UP (ref 2.5–4.5)
PLATELET # BLD AUTO: 316 K/UL — SIGNIFICANT CHANGE UP (ref 150–400)
POTASSIUM SERPL-MCNC: 3.6 MMOL/L — SIGNIFICANT CHANGE UP (ref 3.5–5.3)
POTASSIUM SERPL-SCNC: 3.6 MMOL/L — SIGNIFICANT CHANGE UP (ref 3.5–5.3)
PROT SERPL-MCNC: 7.5 G/DL — SIGNIFICANT CHANGE UP (ref 6–8.3)
RBC # BLD: 5.33 M/UL — SIGNIFICANT CHANGE UP (ref 4.2–5.8)
RBC # FLD: 14 % — SIGNIFICANT CHANGE UP (ref 10.3–14.5)
SALICYLATES SERPL-MCNC: <2 MG/DL — LOW (ref 15–30)
SODIUM SERPL-SCNC: 144 MMOL/L — SIGNIFICANT CHANGE UP (ref 135–145)
WBC # BLD: 4.8 K/UL — SIGNIFICANT CHANGE UP (ref 3.8–10.5)
WBC # FLD AUTO: 4.8 K/UL — SIGNIFICANT CHANGE UP (ref 3.8–10.5)

## 2024-05-17 PROCEDURE — 72125 CT NECK SPINE W/O DYE: CPT | Mod: 26,MC

## 2024-05-17 PROCEDURE — 99291 CRITICAL CARE FIRST HOUR: CPT | Mod: 25

## 2024-05-17 PROCEDURE — 71045 X-RAY EXAM CHEST 1 VIEW: CPT | Mod: 26

## 2024-05-17 PROCEDURE — 12013 RPR F/E/E/N/L/M 2.6-5.0 CM: CPT

## 2024-05-17 PROCEDURE — 70450 CT HEAD/BRAIN W/O DYE: CPT | Mod: 26,MC

## 2024-05-17 PROCEDURE — 99292 CRITICAL CARE ADDL 30 MIN: CPT | Mod: 25

## 2024-05-17 PROCEDURE — 72170 X-RAY EXAM OF PELVIS: CPT | Mod: 26

## 2024-05-17 RX ORDER — THIAMINE MONONITRATE (VIT B1) 100 MG
100 TABLET ORAL ONCE
Refills: 0 | Status: COMPLETED | OUTPATIENT
Start: 2024-05-17 | End: 2024-05-17

## 2024-05-17 RX ORDER — TETANUS TOXOID, REDUCED DIPHTHERIA TOXOID AND ACELLULAR PERTUSSIS VACCINE, ADSORBED 5; 2.5; 8; 8; 2.5 [IU]/.5ML; [IU]/.5ML; UG/.5ML; UG/.5ML; UG/.5ML
0.5 SUSPENSION INTRAMUSCULAR ONCE
Refills: 0 | Status: COMPLETED | OUTPATIENT
Start: 2024-05-17 | End: 2024-05-17

## 2024-05-17 RX ORDER — LIDOCAINE HYDROCHLORIDE AND EPINEPHRINE 10; 10 MG/ML; UG/ML
20 INJECTION, SOLUTION INFILTRATION; PERINEURAL ONCE
Refills: 0 | Status: COMPLETED | OUTPATIENT
Start: 2024-05-17 | End: 2024-05-17

## 2024-05-17 RX ORDER — FOLIC ACID 0.8 MG
1 TABLET ORAL ONCE
Refills: 0 | Status: COMPLETED | OUTPATIENT
Start: 2024-05-17 | End: 2024-05-17

## 2024-05-17 RX ORDER — ACETAMINOPHEN 500 MG
650 TABLET ORAL ONCE
Refills: 0 | Status: COMPLETED | OUTPATIENT
Start: 2024-05-17 | End: 2024-05-17

## 2024-05-17 RX ADMIN — Medication 1 MILLIGRAM(S): at 22:29

## 2024-05-17 RX ADMIN — Medication 1 TABLET(S): at 21:16

## 2024-05-17 RX ADMIN — TETANUS TOXOID, REDUCED DIPHTHERIA TOXOID AND ACELLULAR PERTUSSIS VACCINE, ADSORBED 0.5 MILLILITER(S): 5; 2.5; 8; 8; 2.5 SUSPENSION INTRAMUSCULAR at 21:17

## 2024-05-17 RX ADMIN — Medication 100 MILLIGRAM(S): at 22:28

## 2024-05-17 NOTE — ED PROVIDER NOTE - CARE PLAN
1 Principal Discharge DX:	Alcohol dependence with withdrawal  Secondary Diagnosis:	Closed head injury with concussion, with loss of consciousness, initial encounter  Secondary Diagnosis:	Depression with suicidal ideation  Secondary Diagnosis:	Forehead laceration

## 2024-05-17 NOTE — ED ADULT NURSE NOTE - NSFALLRISKINTERV_ED_ALL_ED

## 2024-05-17 NOTE — ED PROVIDER NOTE - PHYSICAL EXAMINATION
VITALS:   T(C): 36.8 (05-17-24 @ 21:07), Max: 36.8 (05-17-24 @ 20:27)  HR: 93 (05-17-24 @ 21:07) (93 - 104)  BP: 128/91 (05-17-24 @ 21:07) (128/91 - 145/97)  RR: 18 (05-17-24 @ 21:07) (18 - 18)  SpO2: 95% (05-17-24 @ 21:07) (95% - 97%)    GENERAL: NAD, lying in bed comfortably  HEAD:  laceration on left forehead   EYES: EOMI, PERRLA, conjunctiva and sclera injected   ENT: Moist mucous membranes  NECK: Supple, No JVD  CHEST/LUNG: Clear to auscultation bilaterally; No rales, rhonchi, wheezing, or rubs. Unlabored respirations  HEART: Regular rate and rhythm; No murmurs, rubs, or gallops  ABDOMEN: BSx4; Soft, nontender, nondistended  EXTREMITIES:  2+ Peripheral Pulses, brisk capillary refill. No clubbing, cyanosis, or edema  NERVOUS SYSTEM:  A&Ox3, no focal deficits

## 2024-05-17 NOTE — ED PROVIDER NOTE - OBJECTIVE STATEMENT
53 y M w/ PMHx of depression and alcohol use who presents today after a fall. Patient notes that he was drinking a lot today, unable to quantify exact amount but notes that he had a fall with loss of consciousness. He hit his forehead against a wall and developed a laceration from the fall. Patient notes that his head is in pain and pounding. Notes his last drink was at roughly 8:30PM on 5/17. He denies any vomiting, nausea, abdominal pain, chest pain, SOB, or neck pain.

## 2024-05-17 NOTE — ED ADULT NURSE NOTE - OBJECTIVE STATEMENT
53 y.o M BIB EMS p/w c/o fall. A+Ox4. Pt states has hx of depression, has been drinking vodka daily to help "numb the pain". States today was drunk and had a fall, + LOC couple seconds per s/o, pt unsure what he hit his head on. Laceration noted to L frontal forehead, bleeding upon arrival. States having minor neck pain, c-collar in place upon arrival. Reports blurry vision, HA 7/10 and mild anxiety at this time. CIWA initiated. NIH 0. PERRL. Endorsing SI, no HI. Denies plan. No other PMH besides depression. No other complaints at this time, 1:1 in place, comfort and safety maintained.

## 2024-05-17 NOTE — ED ADULT TRIAGE NOTE - NS ED TRIAGE AVPU SCALE
Alert-The patient is alert, awake and responds to voice. The patient is oriented to time, place, and person. The triage nurse is able to obtain subjective information.
other

## 2024-05-17 NOTE — ED ADULT NURSE NOTE - SKIN TURGOR
87y M hx HTN, remote hx colon ca, bladder tumor s/p recent tumor resection few month ago per patient, present with difficulty of urination sudden onset penile bleeding and pain with urine retention, UTI, hematuria fever, leukocytosis, sepsis resilient/elastic

## 2024-05-17 NOTE — ED PROVIDER NOTE - NS ED ROS FT
REVIEW OF SYSTEMS:  CONSTITUTIONAL: No weakness, fevers, chills, sick contacts, or unintended weight loss  EYES: No visual changes or vertigo  ENT: No throat pain, rhinorrhea, or hearing loss   NECK: No pain or stiffness  RESPIRATORY: No cough, wheezing, hemoptysis; No shortness of breath  CARDIOVASCULAR: No chest pain or palpitations  GASTROINTESTINAL: No abdominal or epigastric pain. No nausea, vomiting, or hematemesis; No diarrhea or constipation. No melena or hematochezia.  GENITOURINARY: No dysuria, frequency or hematuria  NEUROLOGICAL: No numbness or weakness  SKIN: laceration on left forehead   Psych: depressed mood

## 2024-05-17 NOTE — ED PROVIDER NOTE - CLINICAL SUMMARY MEDICAL DECISION MAKING FREE TEXT BOX
Patient with medical history of depression and alcohol use presenting with fall after alcohol use. Presented with laceration of left forehead. Will check imaging to rule out intracranial process and start patient of CIWA protocol.

## 2024-05-17 NOTE — ED PROVIDER NOTE - ATTENDING CONTRIBUTION TO CARE
------------ATTENDING NOTE------------  pt w/ partner brought to ED by EMS after found down, pt describes increased stress/depression, crying and thinking about death/suicide, describes daily drinking to cope, gets shaky when not drinking, c/o laceration to forehead, ABCs intact, HD stable, initial labs/imaging as expected w/o significant trauma, admitting for alcohol withdrawal, optimize medical mgmt, psych clearance.  - Teofilo Cormier MD   ---------------------------------------------

## 2024-05-17 NOTE — ED PROVIDER NOTE - SECONDARY DIAGNOSIS.
Forehead laceration Closed head injury with concussion, with loss of consciousness, initial encounter Depression with suicidal ideation
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
## 2024-05-18 DIAGNOSIS — F10.239 ALCOHOL DEPENDENCE WITH WITHDRAWAL, UNSPECIFIED: ICD-10-CM

## 2024-05-18 DIAGNOSIS — Z29.9 ENCOUNTER FOR PROPHYLACTIC MEASURES, UNSPECIFIED: ICD-10-CM

## 2024-05-18 DIAGNOSIS — F19.90 OTHER PSYCHOACTIVE SUBSTANCE USE, UNSPECIFIED, UNCOMPLICATED: ICD-10-CM

## 2024-05-18 DIAGNOSIS — F10.139 ALCOHOL ABUSE WITH WITHDRAWAL, UNSPECIFIED: ICD-10-CM

## 2024-05-18 LAB
AMPHET UR-MCNC: NEGATIVE — SIGNIFICANT CHANGE UP
APPEARANCE UR: CLEAR — SIGNIFICANT CHANGE UP
BACTERIA # UR AUTO: NEGATIVE /HPF — SIGNIFICANT CHANGE UP
BARBITURATES UR SCN-MCNC: NEGATIVE — SIGNIFICANT CHANGE UP
BENZODIAZ UR-MCNC: NEGATIVE — SIGNIFICANT CHANGE UP
BILIRUB UR-MCNC: NEGATIVE — SIGNIFICANT CHANGE UP
CAST: 1 /LPF — SIGNIFICANT CHANGE UP (ref 0–4)
COCAINE METAB.OTHER UR-MCNC: NEGATIVE — SIGNIFICANT CHANGE UP
COLOR SPEC: YELLOW — SIGNIFICANT CHANGE UP
DIFF PNL FLD: NEGATIVE — SIGNIFICANT CHANGE UP
GLUCOSE UR QL: NEGATIVE MG/DL — SIGNIFICANT CHANGE UP
HAV IGM SER-ACNC: SIGNIFICANT CHANGE UP
HBV CORE IGM SER-ACNC: SIGNIFICANT CHANGE UP
HBV SURFACE AG SER-ACNC: SIGNIFICANT CHANGE UP
HCV AB S/CO SERPL IA: 0.21 S/CO — SIGNIFICANT CHANGE UP (ref 0–0.99)
HCV AB SERPL-IMP: SIGNIFICANT CHANGE UP
KETONES UR-MCNC: ABNORMAL MG/DL
LEUKOCYTE ESTERASE UR-ACNC: NEGATIVE — SIGNIFICANT CHANGE UP
METHADONE UR-MCNC: NEGATIVE — SIGNIFICANT CHANGE UP
NITRITE UR-MCNC: NEGATIVE — SIGNIFICANT CHANGE UP
OPIATES UR-MCNC: NEGATIVE — SIGNIFICANT CHANGE UP
OXYCODONE UR-MCNC: NEGATIVE — SIGNIFICANT CHANGE UP
PCP SPEC-MCNC: SIGNIFICANT CHANGE UP
PCP UR-MCNC: NEGATIVE — SIGNIFICANT CHANGE UP
PH UR: 5.5 — SIGNIFICANT CHANGE UP (ref 5–8)
PROT UR-MCNC: NEGATIVE MG/DL — SIGNIFICANT CHANGE UP
RBC CASTS # UR COMP ASSIST: 0 /HPF — SIGNIFICANT CHANGE UP (ref 0–4)
SP GR SPEC: 1.01 — SIGNIFICANT CHANGE UP (ref 1–1.03)
SQUAMOUS # UR AUTO: 1 /HPF — SIGNIFICANT CHANGE UP (ref 0–5)
THC UR QL: POSITIVE
TSH SERPL-MCNC: 0.95 UIU/ML — SIGNIFICANT CHANGE UP (ref 0.27–4.2)
UROBILINOGEN FLD QL: 0.2 MG/DL — SIGNIFICANT CHANGE UP (ref 0.2–1)
WBC UR QL: 2 /HPF — SIGNIFICANT CHANGE UP (ref 0–5)

## 2024-05-18 PROCEDURE — 99223 1ST HOSP IP/OBS HIGH 75: CPT

## 2024-05-18 RX ORDER — ACETAMINOPHEN 500 MG
650 TABLET ORAL EVERY 6 HOURS
Refills: 0 | Status: DISCONTINUED | OUTPATIENT
Start: 2024-05-18 | End: 2024-05-21

## 2024-05-18 RX ORDER — FOLIC ACID 0.8 MG
1 TABLET ORAL DAILY
Refills: 0 | Status: DISCONTINUED | OUTPATIENT
Start: 2024-05-18 | End: 2024-05-21

## 2024-05-18 RX ORDER — THIAMINE MONONITRATE (VIT B1) 100 MG
100 TABLET ORAL DAILY
Refills: 0 | Status: COMPLETED | OUTPATIENT
Start: 2024-05-18 | End: 2024-05-20

## 2024-05-18 RX ORDER — HEPARIN SODIUM 5000 [USP'U]/ML
5000 INJECTION INTRAVENOUS; SUBCUTANEOUS EVERY 8 HOURS
Refills: 0 | Status: DISCONTINUED | OUTPATIENT
Start: 2024-05-18 | End: 2024-05-21

## 2024-05-18 RX ADMIN — Medication 650 MILLIGRAM(S): at 16:00

## 2024-05-18 RX ADMIN — Medication 1 TABLET(S): at 09:28

## 2024-05-18 RX ADMIN — HEPARIN SODIUM 5000 UNIT(S): 5000 INJECTION INTRAVENOUS; SUBCUTANEOUS at 23:54

## 2024-05-18 RX ADMIN — Medication 650 MILLIGRAM(S): at 15:34

## 2024-05-18 RX ADMIN — Medication 100 MILLIGRAM(S): at 09:28

## 2024-05-18 RX ADMIN — Medication 650 MILLIGRAM(S): at 00:34

## 2024-05-18 RX ADMIN — Medication 650 MILLIGRAM(S): at 00:04

## 2024-05-18 RX ADMIN — Medication 1 MILLIGRAM(S): at 09:28

## 2024-05-18 NOTE — H&P ADULT - NEUROLOGICAL
details… cranial nerves II-XII intact/sensation intact/responds to verbal commands/deep reflexes intact/cranial nerves intact/superficial reflexes intact

## 2024-05-18 NOTE — H&P ADULT - HISTORY OF PRESENT ILLNESS
The patient is a 53 M with h/o depression and alcohol abuse presented in ED after he fell at home, misstepped the stairs while he drank too much of Vodka he said.  He drinks about a pint of Vodka regularly with friends, no c/o chest pain, HA, palpitations, SOB or abdominal pain. After the fall he had LOC and he hits his forehead against a wall and developed a laceration. He has been smoking cigarettes and Marijuana regularly and had never had ETOH inpatient or outpatient rehab for alcohol & drug abuse. The patient is a 53 M with h/o depression and alcohol abuse presented in ED after he fell at home, misstepped the stairs while he drank too much of Vodka he said.  He drinks about a pint of Vodka regularly with friends, no c/o chest pain, HA, palpitations, SOB or abdominal pain. After the fall he had LOC and he hits his forehead against a wall and developed a laceration. He has been smoking cigarettes and Marijuana regularly and had never had ETOH inpatient or outpatient rehab for alcohol & drug abuse.  Patient's wife at bedside

## 2024-05-18 NOTE — H&P ADULT - PROBLEM SELECTOR PLAN 1
He is a high risk for alcohol withdrawal given high tolerance . CTH, C-spines are unremarkable post fall  - has been drinking a pint of Vodka regularly, never did ETOH rehab  - will c/w symptoms triggered Benzo like l IV ativan 1mg q 3 hrs prn, Thiamin, folate on a CIWA scale  - advised to stop drinking alcohol  - JEM easton

## 2024-05-18 NOTE — H&P ADULT - ASSESSMENT
The patient is a 53 M with h/o depression and alcohol abuse presented in ED after he fell at home, misstepped the stairs while he drank too much of Vodka he said.  He drinks about a pint of Vodka regularly with friends, no c/o chest pain, HA, palpitations, SOB or abdominal pain. After the fall he had LOC and he hits his forehead against a wall and developed a laceration. He has been smoking cigarettes and Marijuana regularly and had never had ETOH inpatient or outpatient rehab for alcohol & drug abuse.  In ED his VSS, L forehead laceration was taken care of by Steri-strips. No bleeding noted. . CTH, C-Spine, xray pevis and CXR were unremarkable. He received Thiamin, Folate in ED. Was on 1:1

## 2024-05-18 NOTE — H&P ADULT - PROBLEM SELECTOR PLAN 2
s/p fall at home, had too much drink- Vodka. No chest pain, palpitations or LOC  - CTH, C-spine, Xrays for pelvis, CXR are unremarkable   s/p L forehead laceration repaired with Steri-strips

## 2024-05-18 NOTE — H&P ADULT - PROBLEM SELECTOR PLAN 3
Has been using THC ( marijuana) and smoking cigarettes   - advised to stop smoking  - [place] Has been using THC ( marijuana) and smoking cigarettes   - advised to stop smoking  - JEM easton

## 2024-05-18 NOTE — H&P ADULT - NSHPLABSRESULTS_GEN_ALL_CORE
CTH-  No CT evidence of cervical spine fracture, traumatic malalignment or suspicious osseous lesion. Reversal of cervical lordosis and cervical spine degenerative changes

## 2024-05-19 DIAGNOSIS — F32.9 MAJOR DEPRESSIVE DISORDER, SINGLE EPISODE, UNSPECIFIED: ICD-10-CM

## 2024-05-19 LAB
ALBUMIN SERPL ELPH-MCNC: 4.1 G/DL — SIGNIFICANT CHANGE UP (ref 3.3–5)
ALP SERPL-CCNC: 57 U/L — SIGNIFICANT CHANGE UP (ref 40–120)
ALT FLD-CCNC: 23 U/L — SIGNIFICANT CHANGE UP (ref 10–45)
ANION GAP SERPL CALC-SCNC: 11 MMOL/L — SIGNIFICANT CHANGE UP (ref 5–17)
AST SERPL-CCNC: 19 U/L — SIGNIFICANT CHANGE UP (ref 10–40)
BILIRUB SERPL-MCNC: 1.1 MG/DL — SIGNIFICANT CHANGE UP (ref 0.2–1.2)
BUN SERPL-MCNC: 9 MG/DL — SIGNIFICANT CHANGE UP (ref 7–23)
CALCIUM SERPL-MCNC: 9.6 MG/DL — SIGNIFICANT CHANGE UP (ref 8.4–10.5)
CHLORIDE SERPL-SCNC: 101 MMOL/L — SIGNIFICANT CHANGE UP (ref 96–108)
CHOLEST SERPL-MCNC: 198 MG/DL — SIGNIFICANT CHANGE UP
CO2 SERPL-SCNC: 27 MMOL/L — SIGNIFICANT CHANGE UP (ref 22–31)
CREAT SERPL-MCNC: 0.81 MG/DL — SIGNIFICANT CHANGE UP (ref 0.5–1.3)
EGFR: 106 ML/MIN/1.73M2 — SIGNIFICANT CHANGE UP
GLUCOSE SERPL-MCNC: 85 MG/DL — SIGNIFICANT CHANGE UP (ref 70–99)
HDLC SERPL-MCNC: 67 MG/DL — SIGNIFICANT CHANGE UP
LIPID PNL WITH DIRECT LDL SERPL: 113 MG/DL — HIGH
NON HDL CHOLESTEROL: 131 MG/DL — HIGH
POTASSIUM SERPL-MCNC: 3.8 MMOL/L — SIGNIFICANT CHANGE UP (ref 3.5–5.3)
POTASSIUM SERPL-SCNC: 3.8 MMOL/L — SIGNIFICANT CHANGE UP (ref 3.5–5.3)
PROT SERPL-MCNC: 6.6 G/DL — SIGNIFICANT CHANGE UP (ref 6–8.3)
SODIUM SERPL-SCNC: 139 MMOL/L — SIGNIFICANT CHANGE UP (ref 135–145)
TRIGL SERPL-MCNC: 103 MG/DL — SIGNIFICANT CHANGE UP

## 2024-05-19 PROCEDURE — 99233 SBSQ HOSP IP/OBS HIGH 50: CPT

## 2024-05-19 RX ADMIN — Medication 1 MILLIGRAM(S): at 12:24

## 2024-05-19 RX ADMIN — HEPARIN SODIUM 5000 UNIT(S): 5000 INJECTION INTRAVENOUS; SUBCUTANEOUS at 07:51

## 2024-05-19 RX ADMIN — Medication 100 MILLIGRAM(S): at 12:24

## 2024-05-19 RX ADMIN — Medication 1 TABLET(S): at 12:24

## 2024-05-19 NOTE — PROGRESS NOTE ADULT - PROBLEM SELECTOR PLAN 3
Has been using THC ( marijuana) and smoking cigarettes   - advised to stop smoking  - JEM easton Patient reports having ongoing depression; and at times he reports having suicidal ideation  He has never been formally evaluated or treated  Obtain Psych consult on Monday

## 2024-05-19 NOTE — PROGRESS NOTE ADULT - PROBLEM SELECTOR PLAN 4
Heparin sc Has been using THC ( marijuana) and smoking cigarettes   - advised to stop smoking  - JEM easton

## 2024-05-19 NOTE — PROGRESS NOTE ADULT - SUBJECTIVE AND OBJECTIVE BOX
****************************************  Oracio Posadas MD  Available on Microsoft Teams  ****************************************  SUBJECTIVE  Patient seen and examined at bedside. No acute events overnight  Denied HA, CP, SOB, n/v/d/c , fevers, chills    OBJECTIVE   Vital Signs Last 24 Hrs  T(C): 36.5 (19 May 2024 06:16), Max: 37.1 (18 May 2024 19:00)  T(F): 97.7 (19 May 2024 06:16), Max: 98.8 (18 May 2024 19:00)  HR: 80 (19 May 2024 10:17) (68 - 80)  BP: 124/87 (19 May 2024 10:17) (104/68 - 134/88)  BP(mean): --  RR: 18 (19 May 2024 06:16) (18 - 18)  SpO2: 97% (19 May 2024 10:17) (96% - 100%)    Parameters below as of 19 May 2024 10:17  Patient On (Oxygen Delivery Method): room air        PHYSICAL EXAM:  Constitutional: non-toxic, no distress  HEAD/EYES: anicteric, no conjunctival injection  ENT:  supple, no thrush  Cardiovascular:   normal S1, S2, no murmur, no edema  Respiratory:  clear BS bilaterally, no wheezes, no rales  GI:  soft, non-tender, normal bowel sounds  :  no santos, no CVA tenderness  Musculoskeletal:  no synovitis, normal ROM  Neurologic: awake and alert, normal strength, no focal findings  Skin:  laceration on left forehead  Psychiatric:  awake, alert, appropriate mood          HOSPITAL MEDICATIONS  heparin   Injectable 5000 Unit(s) SubCutaneous every 8 hours            acetaminophen     Tablet .. 650 milliGRAM(s) Oral every 6 hours PRN  LORazepam   Injectable 2 milliGRAM(s) IV Push every 2 hours PRN          folic acid 1 milliGRAM(s) Oral daily  multivitamin 1 Tablet(s) Oral daily  thiamine 100 milliGRAM(s) Oral daily            LABS                        16.6   4.80  )-----------( 316      ( 17 May 2024 22:07 )             48.0       05-19    139  |  101  |  9   ----------------------------<  85  3.8   |  27  |  0.81    Ca    9.6      19 May 2024 06:54  Phos  3.6     05-17  Mg     2.2     05-17    TPro  6.6  /  Alb  4.1  /  TBili  1.1  /  DBili  x   /  AST  19  /  ALT  23  /  AlkPhos  57  05-19      Urinalysis Basic - ( 19 May 2024 06:54 )    Color: x / Appearance: x / SG: x / pH: x  Gluc: 85 mg/dL / Ketone: x  / Bili: x / Urobili: x   Blood: x / Protein: x / Nitrite: x   Leuk Esterase: x / RBC: x / WBC x   Sq Epi: x / Non Sq Epi: x / Bacteria: x        Follow Up:      RADIOLOGY: ****************************************  Oracio Posadas MD  Available on Microsoft Teams  ****************************************  SUBJECTIVE  Patient seen and examined at bedside. No acute events overnight  Denied HA, CP, SOB, n/v/d/c , fevers, chills  Endorses depression but denies SI/HI    OBJECTIVE   Vital Signs Last 24 Hrs  T(C): 36.5 (19 May 2024 06:16), Max: 37.1 (18 May 2024 19:00)  T(F): 97.7 (19 May 2024 06:16), Max: 98.8 (18 May 2024 19:00)  HR: 80 (19 May 2024 10:17) (68 - 80)  BP: 124/87 (19 May 2024 10:17) (104/68 - 134/88)  BP(mean): --  RR: 18 (19 May 2024 06:16) (18 - 18)  SpO2: 97% (19 May 2024 10:17) (96% - 100%)    Parameters below as of 19 May 2024 10:17  Patient On (Oxygen Delivery Method): room air        PHYSICAL EXAM:  Constitutional: non-toxic, no distress  HEAD/EYES: anicteric, no conjunctival injection  ENT:  supple, no thrush  Cardiovascular:   normal S1, S2, no murmur, no edema  Respiratory:  clear BS bilaterally, no wheezes, no rales  GI:  soft, non-tender, normal bowel sounds  :  no santos, no CVA tenderness  Musculoskeletal:  no synovitis, normal ROM  Neurologic: awake and alert, normal strength, no focal findings  Skin:  laceration on left forehead  Psychiatric:  awake, alert, appropriate mood          HOSPITAL MEDICATIONS  heparin   Injectable 5000 Unit(s) SubCutaneous every 8 hours            acetaminophen     Tablet .. 650 milliGRAM(s) Oral every 6 hours PRN  LORazepam   Injectable 2 milliGRAM(s) IV Push every 2 hours PRN          folic acid 1 milliGRAM(s) Oral daily  multivitamin 1 Tablet(s) Oral daily  thiamine 100 milliGRAM(s) Oral daily            LABS                        16.6   4.80  )-----------( 316      ( 17 May 2024 22:07 )             48.0       05-19    139  |  101  |  9   ----------------------------<  85  3.8   |  27  |  0.81    Ca    9.6      19 May 2024 06:54  Phos  3.6     05-17  Mg     2.2     05-17    TPro  6.6  /  Alb  4.1  /  TBili  1.1  /  DBili  x   /  AST  19  /  ALT  23  /  AlkPhos  57  05-19      Urinalysis Basic - ( 19 May 2024 06:54 )    Color: x / Appearance: x / SG: x / pH: x  Gluc: 85 mg/dL / Ketone: x  / Bili: x / Urobili: x   Blood: x / Protein: x / Nitrite: x   Leuk Esterase: x / RBC: x / WBC x   Sq Epi: x / Non Sq Epi: x / Bacteria: x        Follow Up:      RADIOLOGY:

## 2024-05-19 NOTE — PHYSICAL THERAPY INITIAL EVALUATION ADULT - ADDITIONAL COMMENTS
Pt lives with wife in duplex apartment. Ambulates independently with no device. No hx of falls other than this one.

## 2024-05-19 NOTE — PHYSICAL THERAPY INITIAL EVALUATION ADULT - PERTINENT HX OF CURRENT PROBLEM, REHAB EVAL
53 y/oM with PMH depression and alcohol abuse presented in ED after he fell at home, stating misstepped the stairs while he drank too much Vodka.  States he drinks about a pint of Vodka regularly with friends. After the fall he had LOC and he hits his forehead against a wall and developed a laceration. He has been smoking cigarettes and Marijuana regularly and had never had ETOH inpatient or outpatient rehab for alcohol & drug abuse. In ED L forehead was addressed with steri-strips. As per H&P, pt at high risk for withdrawal.

## 2024-05-20 LAB
ANION GAP SERPL CALC-SCNC: 10 MMOL/L — SIGNIFICANT CHANGE UP (ref 5–17)
BUN SERPL-MCNC: 8 MG/DL — SIGNIFICANT CHANGE UP (ref 7–23)
CALCIUM SERPL-MCNC: 9.1 MG/DL — SIGNIFICANT CHANGE UP (ref 8.4–10.5)
CHLORIDE SERPL-SCNC: 102 MMOL/L — SIGNIFICANT CHANGE UP (ref 96–108)
CO2 SERPL-SCNC: 27 MMOL/L — SIGNIFICANT CHANGE UP (ref 22–31)
CREAT SERPL-MCNC: 0.73 MG/DL — SIGNIFICANT CHANGE UP (ref 0.5–1.3)
EGFR: 109 ML/MIN/1.73M2 — SIGNIFICANT CHANGE UP
GLUCOSE SERPL-MCNC: 95 MG/DL — SIGNIFICANT CHANGE UP (ref 70–99)
POTASSIUM SERPL-MCNC: 3.6 MMOL/L — SIGNIFICANT CHANGE UP (ref 3.5–5.3)
POTASSIUM SERPL-SCNC: 3.6 MMOL/L — SIGNIFICANT CHANGE UP (ref 3.5–5.3)
SODIUM SERPL-SCNC: 139 MMOL/L — SIGNIFICANT CHANGE UP (ref 135–145)

## 2024-05-20 PROCEDURE — 99221 1ST HOSP IP/OBS SF/LOW 40: CPT

## 2024-05-20 PROCEDURE — 99232 SBSQ HOSP IP/OBS MODERATE 35: CPT

## 2024-05-20 RX ADMIN — Medication 1 MILLIGRAM(S): at 13:09

## 2024-05-20 RX ADMIN — HEPARIN SODIUM 5000 UNIT(S): 5000 INJECTION INTRAVENOUS; SUBCUTANEOUS at 21:48

## 2024-05-20 RX ADMIN — Medication 650 MILLIGRAM(S): at 23:46

## 2024-05-20 RX ADMIN — Medication 100 MILLIGRAM(S): at 13:09

## 2024-05-20 RX ADMIN — HEPARIN SODIUM 5000 UNIT(S): 5000 INJECTION INTRAVENOUS; SUBCUTANEOUS at 13:09

## 2024-05-20 RX ADMIN — Medication 1 TABLET(S): at 13:09

## 2024-05-20 NOTE — BH CONSULTATION LIAISON ASSESSMENT NOTE - MARITAL STATUS
Animal Bite HPI





- General


Chief Complaint: Animal Bite


Stated Complaint: Dog bite on face/33 wks pregnant


Time Seen by Provider: 10/28/19 18:45


Source: patient, RN notes reviewed


Mode of arrival: ambulatory


Limitations: no limitations





- History of Present Illness


Initial Comments: 





35-year-old female presents emergency Department chief complaint of dog bite.  

Patient states she is a .  Patient states that the dog she was 

examining her in the face.  Patient has some puncture wounds to her nose, ear 

lip laceration the upper aspect, chin laceration.  Patient states her tetanus is

updated one month ago.  Patient states she is currently 33 weeks pregnant.  

Patient denies any ALLERGIES to antibiotics.





- Related Data


                                Home Medications











 Medication  Instructions  Recorded  Confirmed


 


Levothyroxine Sodium [Synthroid] 75 mcg PO DAILY 09/05/15 03/14/18


 


Pnv,Calcium 72/Iron/Folic Acid 1 each PO DAILY 09/05/15 03/14/18





[Prenatal Plus Tablet]   


 


Citalopram Hydrobromide 20 mg PO DAILY 18





[Citalopram HBr]   








                                  Previous Rx's











 Medication  Instructions  Recorded


 


Amoxicillin/Potassium Clav 1 tab PO Q12HR #20 tab 10/28/19





[Augmentin 875-125 Tablet]  











                                    Allergies











Allergy/AdvReac Type Severity Reaction Status Date / Time


 


butorphanol [From Stadol] Allergy  Unknown Verified 10/28/19 18:43














Review of Systems


ROS Statement: 


Those systems with pertinent positive or pertinent negative responses have been 

documented in the HPI.





ROS Other: All systems not noted in ROS Statement are negative.





Past Medical History


Past Medical History: GERD/Reflux, Hyperlipidemia, Thyroid Disorder


Additional Past Medical History / Comment(s): migraines, refux during pregnancy,

diarrhea, hx kidney stones during pregnacy


History of Any Multi-Drug Resistant Organisms: None Reported


Past Surgical History: Breast Surgery,  Section


Additional Past Surgical History / Comment(s): dorothy breast reduction, carpal 

tunnel surgery


Past Anesthesia/Blood Transfusion Reactions: Motion Sickness


Additional Past Anesthesia/Blood Transfusion Reaction / Comment(s): had epidural

for C/S- "had cholinergic episode requiring atropine during epidural". was  

unresponsive and slept right through labor. possibly from stadol per pt.


Past Psychological History: Depression


Smoking Status: Never smoker


Past Alcohol Use History: None Reported


Past Drug Use History: None Reported





- Past Family History


  ** Mother


Family Medical History: No Reported History





  ** Father


Family Medical History: Cancer, Deep Vein Thrombosis (DVT), Myocardial 

Infarction (MI)


Additional Family Medical History / Comment(s): .





General Exam


Limitations: no limitations


General appearance: alert, in no apparent distress


Head exam: Present: atraumatic, normocephalic, normal inspection


Eye exam: Present: normal appearance, PERRL, EOMI.  Absent: scleral icterus, 

conjunctival injection, periorbital swelling


ENT exam: Present: mucous membranes moist, TM's normal bilaterally, normal 

external ear exam, other (Nasal bridge there are multiple puncture wounds with 

no active bleeding, chin there is a 1 cm semicircular laceration).  Absent: 

normal exam, normal oropharynx (Upper inner aspect of the lip there is an 

irregular 1 cm laceration)


Neck exam: Present: normal inspection, full ROM.  Absent: tenderness, 

meningismus, lymphadenopathy


Respiratory exam: Present: normal lung sounds bilaterally.  Absent: respiratory 

distress, wheezes, rales, rhonchi, stridor


Cardiovascular Exam: Present: regular rate, normal rhythm, normal heart sounds. 

Absent: systolic murmur, diastolic murmur, rubs, gallop, clicks


Neurological exam: Present: alert, oriented X3, CN II-XII intact


Skin exam: Present: warm, dry, intact, normal color.  Absent: rash





Course


                                   Vital Signs











  10/28/19





  18:37


 


Temperature 98.6 F


 


Pulse Rate 95


 


Respiratory 18





Rate 


 


Blood Pressure 127/67


 


O2 Sat by Pulse 98





Oximetry 














Procedures





- Laceration


  ** Laceration #1


Consent Obtained: verbal consent


Indication: laceration


Site: lip


Size (cm): 2


Description: stellate, irregular


Anesthetic Used: lidocaine 1%


Anesthesia Technique: local infiltration


Amount (mls): 2


Pre-repair: wound explored, irrigated extensively


Type of Sutures: other (Rapid repid)


Size of Sutures: 5-0


Technique: simple, interrupted


Patient Tolerated Procedure: well, no complications





  ** Laceration #2


Description: stellate, irregular


Size of Sutures: other (exofin dermal glue)





Medical Decision Making





- Medical Decision Making





Patient's lacerations were cleaned, closed with sutures in the upper lip, Zofran

on the chin patient was discharged with Augmentin.





Disposition


Clinical Impression: 


 Dog bite, Facial laceration





Disposition: HOME SELF-CARE


Instructions (If sedation given, give patient instructions):  Animal Bite (ED)


Additional Instructions: 


Please return to the Emergency Department if symptoms worsen or any other 

concerns.


Prescriptions: 


Amoxicillin/Potassium Clav [Augmentin 875-125 Tablet] 1 tab PO Q12HR #20 tab


Is patient prescribed a controlled substance at d/c from ED?: No


Referrals: 


Edna Sharma MD [Primary Care Provider] - 1-2 days


Time of Disposition: 20:07 Single

## 2024-05-20 NOTE — PROGRESS NOTE ADULT - PROBLEM SELECTOR PLAN 2
s/p fall at home, had too much drink- Vodka. No chest pain, palpitations or LOC  - CTH, C-spine, Xrays for pelvis, CXR are unremarkable   s/p L forehead laceration repaired with Steri-strips
s/p fall at home, had too much drink- Vodka. No chest pain, palpitations or LOC  - CTH, C-spine, Xrays for pelvis, CXR are unremarkable   s/p L forehead laceration repaired with Steri-strips.

## 2024-05-20 NOTE — BH CONSULTATION LIAISON ASSESSMENT NOTE - HPI (INCLUDE ILLNESS QUALITY, SEVERITY, DURATION, TIMING, CONTEXT, MODIFYING FACTORS, ASSOCIATED SIGNS AND SYMPTOMS)
Pt is a 52 y/o single male with no past psychiatric history, presents with s/p fall following alcohol intoxication at home and pt stable at this time. Psych called to address depression. pt states "I get depressed sometimes," but denies si/hi, no hx of self injurious behaviors or suicide attempts. pt reports he worked as a , has not worked in 3 months, and reports mood as fine on most days. Pt reports fair sleep and appetite, no hx of manic or psychotic symptoms, and admits to cannabis use. Pt denies excessive alcohol intake, denies it being a problem for him. Pt denies hx of withdrawal symptoms, no DTs or tremors or withdrawal seizures. pt denies current anxiety symptoms, no panic attacks. pt gave consent to speak with his GF for collateral. pt denies feeling hopeless or helpless.

## 2024-05-20 NOTE — PROGRESS NOTE ADULT - ASSESSMENT
The patient is a 53 M with h/o depression and alcohol abuse presented in ED after he fell at home, misstepped the stairs while he drank too much of Vodka he said.  He drinks about a pint of Vodka regularly with friends, no c/o chest pain, HA, palpitations, SOB or abdominal pain. After the fall he had LOC and he hits his forehead against a wall and developed a laceration. He has been smoking cigarettes and Marijuana regularly and had never had ETOH inpatient or outpatient rehab for alcohol & drug abuse.  In ED his VSS, L forehead laceration was taken care of by Steri-strips. No bleeding noted. . CTH, C-Spine, xray pevis and CXR were unremarkable. He received Thiamin, Folate in ED. Was on 1:1. 
The patient is a 53 M with h/o depression and alcohol abuse presented in ED after he fell at home, misstepped the stairs while he drank too much of Vodka he said.  He drinks about a pint of Vodka regularly with friends, no c/o chest pain, HA, palpitations, SOB or abdominal pain. After the fall he had LOC and he hits his forehead against a wall and developed a laceration. He has been smoking cigarettes and Marijuana regularly and had never had ETOH inpatient or outpatient rehab for alcohol & drug abuse.  In ED his VSS, L forehead laceration was taken care of by Steri-strips. No bleeding noted. . CTH, C-Spine, xray pevis and CXR were unremarkable. He received Thiamin, Folate in ED. Was on 1:1

## 2024-05-20 NOTE — PROGRESS NOTE ADULT - PROBLEM SELECTOR PLAN 4
Has been using THC ( marijuana) and smoking cigarettes   - advised to stop smoking  - SW evaluation.

## 2024-05-20 NOTE — PROGRESS NOTE ADULT - PROBLEM SELECTOR PLAN 3
Patient reports having ongoing depression; and at times he reports having suicidal ideation  He has never been formally evaluated or treated  Psych consulted see above

## 2024-05-20 NOTE — BH CONSULTATION LIAISON ASSESSMENT NOTE - RISK ASSESSMENT
currently low, denies si/hi, no hx of self injurious behaviors or suicide attempts. pt may be at elevated risk of self harm if he were to become intoxicated, due to poor impulse control and judgment. pt educated on importance of sobriety.

## 2024-05-20 NOTE — BH CONSULTATION LIAISON ASSESSMENT NOTE - NSBHCHARTREVIEWVS_PSY_A_CORE FT
Vital Signs Last 24 Hrs  T(C): 36.3 (20 May 2024 05:12), Max: 36.7 (19 May 2024 20:11)  T(F): 97.4 (20 May 2024 05:12), Max: 98.1 (19 May 2024 20:11)  HR: 79 (20 May 2024 05:12) (79 - 80)  BP: 110/71 (20 May 2024 05:12) (110/71 - 119/77)  BP(mean): --  RR: 18 (20 May 2024 05:12) (18 - 18)  SpO2: 97% (20 May 2024 05:12) (97% - 98%)    Parameters below as of 20 May 2024 05:12  Patient On (Oxygen Delivery Method): room air

## 2024-05-20 NOTE — PROGRESS NOTE ADULT - PROBLEM SELECTOR PLAN 1
He is a high risk for alcohol withdrawal given high tolerance . CTH, C-spines are unremarkable post fall  - has been drinking a pint of Vodka regularly, never did ETOH rehab  - will c/w symptoms triggered Benzo like l IV ativan 1mg q 3 hrs prn, Thiamin, folate on a CIWA scale  - advised to stop drinking alcohol  - JEM easton
He is a high risk for alcohol withdrawal given high tolerance . CTH, C-spines are unremarkable post fall  - has been drinking a pint of Vodka regularly, never did ETOH rehab  - will c/w symptoms triggered Benzo like l IV ativan 1mg q 3 hrs prn, Thiamin, folate on a CIWA scale  - advised to stop drinking alcohol  - SW evaluation.   Behavioral health consulted due to telling attending yesterday sometimes with suicidal ideation--> not endorsing at this time, patient states when drinking does not have impulse control. Educated on abstaining.

## 2024-05-20 NOTE — BH CONSULTATION LIAISON ASSESSMENT NOTE - NSBHCONSULTRECOMMENDOTHER_PSY_A_CORE FT
-pt doing well, denies si/hi and not interested in outpt substance abuse programs; pt would consider calling outpt mental health clinic for psychotherapy, may give [facility] number [phone]  -supportive therapy provided and pt educated on importance of sobriety  -pt gave consent to speak with his GF, number called but voicemail not set up, could not leave message

## 2024-05-20 NOTE — BH CONSULTATION LIAISON ASSESSMENT NOTE - SUMMARY
Pt is a 54 y/o single male with no past psychiatric history, presents with s/p fall following alcohol intoxication at home and pt stable at this time. Psych called to address depression. pt states "I get depressed sometimes," but denies si/hi, no hx of self injurious behaviors or suicide attempts. pt reports he worked as a , has not worked in 3 months, and reports mood as fine on most days. Pt reports fair sleep and appetite, no hx of manic or psychotic symptoms, and admits to cannabis use. Pt denies excessive alcohol intake, denies it being a problem for him. Pt denies hx of withdrawal symptoms, no DTs or tremors or withdrawal seizures. pt denies current anxiety symptoms, no panic attacks. pt gave consent to speak with his GF for collateral. pt denies feeling hopeless or helpless.

## 2024-05-20 NOTE — BH CONSULTATION LIAISON ASSESSMENT NOTE - CURRENT MEDICATION
MEDICATIONS  (STANDING):  folic acid 1 milliGRAM(s) Oral daily  heparin   Injectable 5000 Unit(s) SubCutaneous every 8 hours  multivitamin 1 Tablet(s) Oral daily    MEDICATIONS  (PRN):  acetaminophen     Tablet .. 650 milliGRAM(s) Oral every 6 hours PRN Moderate Pain (4 - 6)  LORazepam   Injectable 2 milliGRAM(s) IV Push every 2 hours PRN Symptom-triggered: each CIWA -Ar score 8 or GREATER

## 2024-05-20 NOTE — PROGRESS NOTE ADULT - SUBJECTIVE AND OBJECTIVE BOX
[facility] Division of Hospital Medicine  Marcio Lucas DO  Pager (M-F, 8A-5P):  MS Teams PREFERRED        SUBJECTIVE / OVERNIGHT EVENTS:  Pt is sleepy on encounter  Arousable, states no acute distress, able to sleep well, denies any pain.   Describes sometimes emotionally fragile during drinking .      MEDICATIONS  (STANDING):  folic acid 1 milliGRAM(s) Oral daily  heparin   Injectable 5000 Unit(s) SubCutaneous every 8 hours  multivitamin 1 Tablet(s) Oral daily    MEDICATIONS  (PRN):  acetaminophen     Tablet .. 650 milliGRAM(s) Oral every 6 hours PRN Moderate Pain (4 - 6)  LORazepam   Injectable 2 milliGRAM(s) IV Push every 2 hours PRN Symptom-triggered: each CIWA -Ar score 8 or GREATER      I&O's Summary      PHYSICAL EXAM:  Vital Signs Last 24 Hrs  T(C): 36.3 (20 May 2024 05:12), Max: 36.7 (19 May 2024 20:11)  T(F): 97.4 (20 May 2024 05:12), Max: 98.1 (19 May 2024 20:11)  HR: 79 (20 May 2024 05:12) (79 - 84)  BP: 110/71 (20 May 2024 05:12) (110/71 - 119/77)  BP(mean): --  RR: 18 (20 May 2024 05:12) (18 - 18)  SpO2: 97% (20 May 2024 05:12) (96% - 98%)    Parameters below as of 20 May 2024 05:12  Patient On (Oxygen Delivery Method): room air      Constitutional: non-toxic, no distress  HEAD/EYES: anicteric, no conjunctival injection  ENT:  supple, no thrush  Cardiovascular:   normal S1, S2, no murmur, no edema  Respiratory:  clear BS bilaterally, no wheezes, no rales  GI:  soft, non-tender, normal bowel sounds  :  no santos, no CVA tenderness  Musculoskeletal:  no synovitis, normal ROM  Neurologic: awake and alert, normal strength, no focal findings  Skin:  laceration on left forehead  Psychiatric:  awake, alert, appropriate mood      LABS:    05-20    139  |  102  |  8   ----------------------------<  95  3.6   |  27  |  0.73    Ca    9.1      20 May 2024 07:03    TPro  6.6  /  Alb  4.1  /  TBili  1.1  /  DBili  x   /  AST  19  /  ALT  23  /  AlkPhos  57  05-19          Urinalysis Basic - ( 20 May 2024 07:03 )    Color: x / Appearance: x / SG: x / pH: x  Gluc: 95 mg/dL / Ketone: x  / Bili: x / Urobili: x   Blood: x / Protein: x / Nitrite: x   Leuk Esterase: x / RBC: x / WBC x   Sq Epi: x / Non Sq Epi: x / Bacteria: x          RADIOLOGY & ADDITIONAL TESTS:  Results Reviewed: CMP personally reviewed by me Cr 0.73  Imaging Personally Reviewed:  Electrocardiogram Personally Reviewed:    COORDINATION OF CARE:  Care Discussed with Consultants/Other Providers [Y/N]: Y  Prior or Outpatient Records Reviewed [Y/N]: Y

## 2024-05-20 NOTE — BH CONSULTATION LIAISON ASSESSMENT NOTE - KNOWN PSYCHIATRIC ADMISSION WITHIN THE PAST 30 DAYS
No
Patient brought in by mother c/o constipation.  Per mother, patient has not had a BM in several days and is complaining of abdominal pain.  Mother has given patient mirolax with no relief.  Mother note poor appetite but normal wet diapers. Patient is in no visible distress, age appropriate behavior noted.

## 2024-05-21 ENCOUNTER — TRANSCRIPTION ENCOUNTER (OUTPATIENT)
Age: 54
End: 2024-05-21

## 2024-05-21 VITALS
RESPIRATION RATE: 18 BRPM | DIASTOLIC BLOOD PRESSURE: 85 MMHG | HEART RATE: 88 BPM | SYSTOLIC BLOOD PRESSURE: 117 MMHG | TEMPERATURE: 98 F | OXYGEN SATURATION: 98 %

## 2024-05-21 PROCEDURE — 80048 BASIC METABOLIC PNL TOTAL CA: CPT

## 2024-05-21 PROCEDURE — 99239 HOSP IP/OBS DSCHRG MGMT >30: CPT

## 2024-05-21 PROCEDURE — 72170 X-RAY EXAM OF PELVIS: CPT

## 2024-05-21 PROCEDURE — 97161 PT EVAL LOW COMPLEX 20 MIN: CPT

## 2024-05-21 PROCEDURE — 36415 COLL VENOUS BLD VENIPUNCTURE: CPT

## 2024-05-21 PROCEDURE — 93005 ELECTROCARDIOGRAM TRACING: CPT | Mod: XU

## 2024-05-21 PROCEDURE — 81001 URINALYSIS AUTO W/SCOPE: CPT

## 2024-05-21 PROCEDURE — 83735 ASSAY OF MAGNESIUM: CPT

## 2024-05-21 PROCEDURE — 90715 TDAP VACCINE 7 YRS/> IM: CPT

## 2024-05-21 PROCEDURE — 72125 CT NECK SPINE W/O DYE: CPT | Mod: MC

## 2024-05-21 PROCEDURE — 84443 ASSAY THYROID STIM HORMONE: CPT

## 2024-05-21 PROCEDURE — 70450 CT HEAD/BRAIN W/O DYE: CPT | Mod: MC

## 2024-05-21 PROCEDURE — 80307 DRUG TEST PRSMV CHEM ANLYZR: CPT

## 2024-05-21 PROCEDURE — 99291 CRITICAL CARE FIRST HOUR: CPT | Mod: 25

## 2024-05-21 PROCEDURE — 85025 COMPLETE CBC W/AUTO DIFF WBC: CPT

## 2024-05-21 PROCEDURE — 80074 ACUTE HEPATITIS PANEL: CPT

## 2024-05-21 PROCEDURE — 84100 ASSAY OF PHOSPHORUS: CPT

## 2024-05-21 PROCEDURE — 90471 IMMUNIZATION ADMIN: CPT

## 2024-05-21 PROCEDURE — 80053 COMPREHEN METABOLIC PANEL: CPT

## 2024-05-21 PROCEDURE — 80061 LIPID PANEL: CPT

## 2024-05-21 PROCEDURE — 96374 THER/PROPH/DIAG INJ IV PUSH: CPT | Mod: XU

## 2024-05-21 PROCEDURE — 12013 RPR F/E/E/N/L/M 2.6-5.0 CM: CPT

## 2024-05-21 PROCEDURE — 71045 X-RAY EXAM CHEST 1 VIEW: CPT

## 2024-05-21 PROCEDURE — 86703 HIV-1/HIV-2 1 RESULT ANTBDY: CPT

## 2024-05-21 RX ORDER — FOLIC ACID 0.8 MG
1 TABLET ORAL
Qty: 30 | Refills: 0
Start: 2024-05-21 | End: 2024-06-19

## 2024-05-21 RX ADMIN — Medication 1 MILLIGRAM(S): at 12:30

## 2024-05-21 RX ADMIN — Medication 650 MILLIGRAM(S): at 00:55

## 2024-05-21 RX ADMIN — HEPARIN SODIUM 5000 UNIT(S): 5000 INJECTION INTRAVENOUS; SUBCUTANEOUS at 05:57

## 2024-05-21 RX ADMIN — HEPARIN SODIUM 5000 UNIT(S): 5000 INJECTION INTRAVENOUS; SUBCUTANEOUS at 15:38

## 2024-05-21 RX ADMIN — Medication 1 TABLET(S): at 12:30

## 2024-05-21 NOTE — DISCHARGE NOTE PROVIDER - PROVIDER TOKENS
FREE:[LAST:[.],FIRST:[.],PHONE:[(   )    -],FAX:[(   )    -],ADDRESS:[Encouraged to establish PCP in community   [facility] out patient rehab]]

## 2024-05-21 NOTE — DISCHARGE NOTE PROVIDER - NSDCCPCAREPLAN_GEN_ALL_CORE_FT
PRINCIPAL DISCHARGE DIAGNOSIS  Diagnosis: Alcohol dependence with withdrawal  Assessment and Plan of Treatment: symptoms trigerred ciwa protocol followed   follow up with PCP as needed      SECONDARY DISCHARGE DIAGNOSES  Diagnosis: Closed head injury with concussion, with loss of consciousness, initial encounter  Assessment and Plan of Treatment:     Diagnosis: Depression with suicidal ideation  Assessment and Plan of Treatment:     Diagnosis: Forehead laceration  Assessment and Plan of Treatment:

## 2024-05-21 NOTE — DISCHARGE NOTE NURSING/CASE MANAGEMENT/SOCIAL WORK - PATIENT PORTAL LINK FT
You can access the FollowMyHealth Patient Portal offered by [facility] by registering at the following website: [email or web address]. By joining [facility]’s FollowMyHealth portal, you will also be able to view your health information using other applications (apps) compatible with our system.

## 2024-05-21 NOTE — DISCHARGE NOTE PROVIDER - CARE PROVIDER_API CALL
., .  Encouraged to establish PCP in community   [facility] out patient rehab  Phone: (   )    -  Fax: (   )    -  Follow Up Time:

## 2024-05-21 NOTE — DISCHARGE NOTE PROVIDER - HOSPITAL COURSE
HPI:  The patient is a 53 M with h/o depression and alcohol abuse presented in ED after he fell at home, misstepped the stairs while he drank too much of Vodka he said.  He drinks about a pint of Vodka regularly with friends, no c/o chest pain, HA, palpitations, SOB or abdominal pain. After the fall he had LOC and he hits his forehead against a wall and developed a laceration. He has been smoking cigarettes and Marijuana regularly and had never had ETOH inpatient or outpatient rehab for alcohol & drug abuse.  Patient's wife at bedside.    Hospital Course:   53 M with h/o depression and alcohol abuse presented in ED after he fell at home, misstepped the stairs while he drank too much of Vodka he said.  He drinks about a pint of Vodka regularly with friends, no c/o chest pain, HA, palpitations, SOB or abdominal pain. After the fall he had LOC and he hits his forehead against a wall and developed a laceration.   etoh withdrawal seen by psych, no longer requiring ativan.  DCP with no home needs PT cleared by Dr Lucas      Important Medication Changes and Reason: N/A     Active or Pending Issues Requiring Follow-up: pcp     Advanced Directives:   [ x] Full code  [ ] DNR  [ ] Hospice    Discharge Diagnoses:   ETOH withdrawal          HPI:  The patient is a 53 M with h/o depression and alcohol abuse presented in ED after he fell at home, misstepped the stairs while he drank too much of Vodka he said.  He drinks about a pint of Vodka regularly with friends, no c/o chest pain, HA, palpitations, SOB or abdominal pain. After the fall he had LOC and he hits his forehead against a wall and developed a laceration. He has been smoking cigarettes and Marijuana regularly and had never had ETOH inpatient or outpatient rehab for alcohol & drug abuse.  Patient's wife at bedside.    Hospital Course:   53 M with h/o depression and alcohol abuse presented in ED after he fell at home, misstepped the stairs while he drank too much of Vodka he said.  He drinks about a pint of Vodka regularly with friends, no c/o chest pain, HA, palpitations, SOB or abdominal pain. After the fall he had LOC and he hits his forehead against a wall and developed a laceration.   etoh withdrawal seen by psych, no longer requiring ativan.  DCP with no home needs PT cleared by Dr Lucas.       Important Medication Changes and Reason: N/A     Active or Pending Issues Requiring Follow-up: pcp     Advanced Directives:   [ x] Full code  [ ] DNR  [ ] Hospice    Discharge Diagnoses:   ETOH withdrawal

## 2024-05-21 NOTE — DISCHARGE NOTE NURSING/CASE MANAGEMENT/SOCIAL WORK - NSDCVIVACCINE_GEN_ALL_CORE_FT
Tdap; 17-May-2024 21:17; Nieves Staples (RN); [employer]; [id number] (Exp. Date: 12-Dec-2025); IntraMuscular; Deltoid Left.; 0.5 milliLiter(s); VIS (VIS Published: 09-May-2013, VIS Presented: 17-May-2024);

## 2024-05-21 NOTE — DISCHARGE NOTE PROVIDER - ATTENDING DISCHARGE PHYSICAL EXAMINATION:
Vital Signs Last 24 Hrs  T(C): 36.9 (21 May 2024 15:51), Max: 36.9 (20 May 2024 21:08)  T(F): 98.5 (21 May 2024 15:51), Max: 98.5 (20 May 2024 21:08)  HR: 88 (21 May 2024 15:51) (68 - 93)  BP: 117/85 (21 May 2024 15:51) (109/72 - 130/82)  BP(mean): --  RR: 18 (21 May 2024 15:51) (18 - 18)  SpO2: 98% (21 May 2024 15:51) (95% - 100%)    Parameters below as of 21 May 2024 15:51  Patient On (Oxygen Delivery Method): room air        CONSTITUTIONAL: NAD, well-developed, well-groomed  EYES: PERRLA; conjunctiva and sclera clear  ENMT: Moist oral mucosa, no pharyngeal injection or exudates; normal dentition  NECK: Supple, no palpable masses; no thyromegaly  RESPIRATORY: Normal respiratory effort; lungs are clear to auscultation bilaterally  CARDIOVASCULAR: Regular rate and rhythm, normal S1 and S2, no murmur/rub/gallop; No lower extremity edema; Peripheral pulses are 2+ bilaterally  ABDOMEN: Nontender to palpation, normoactive bowel sounds, no rebound/guarding; No hepatosplenomegaly  MUSCULOSKELETAL:  Normal gait; no clubbing or cyanosis of digits; no joint swelling or tenderness to palpation  PSYCH: A+O to person, place, and time; affect appropriate  NEUROLOGY: CN 2-12 are intact and symmetric; no gross sensory deficits   SKIN: No rashes; no palpable lesions

## 2024-05-21 NOTE — DISCHARGE NOTE PROVIDER - NSDCFUSCHEDAPPT_GEN_ALL_CORE_FT
[facility]  [facility]  [facility]   Scheduled Appointment: 07/01/2024

## 2024-05-21 NOTE — DISCHARGE NOTE PROVIDER - NSDCFUADDAPPT_GEN_ALL_CORE_FT
APPTS ARE READY TO BE MADE: [ x] YES    Best Family or Patient Contact (if needed):    Additional Information about above appointments (if needed):    1:   2:   3:     Other comments or requests:    APPTS ARE READY TO BE MADE: [ x] YES    Best Family or Patient Contact (if needed):    Additional Information about above appointments (if needed):    1:   2:   3:     Other comments or requests:     Dr. Soy Zabala MD Appt: 07/01/2024 8:30 AM   [address] [facility] [address], [place], [place] [id number] [phone]

## 2024-05-22 PROBLEM — F10.10 ALCOHOL ABUSE, UNCOMPLICATED: Chronic | Status: ACTIVE | Noted: 2024-05-18

## 2024-05-22 NOTE — ED ADULT NURSE NOTE - CADM POA CENTRAL LINE
Pre-Surgery Instructions:   Medication Instructions    aspirin 81 MG tablet Patient was instructed by Physician and understands   atorvastatin (LIPITOR) 20 mg tablet Patient was instructed by Physician and understands   [START ON 12/29/2019] metroNIDAZOLE (FLAGYL) 500 mg tablet Patient was instructed by Physician and understands   multivitamin [facility]) TABS Patient was instructed by Physician and understands   [START ON 12/29/2019] neomycin (MYCIFRADIN) 500 mg tablet Patient was instructed by Physician and understands   Omega-3 Fatty Acids (FISH OIL) 1200 MG CAPS Patient was instructed by Physician and understands  Pre op and bathing instructions reviewed   20 oz carb drink reviewed(TBD 2 1/2 prior to DOS arrival time) pt has hibiclens
No
(4) no limitation

## 2024-06-26 ENCOUNTER — APPOINTMENT (OUTPATIENT)
Dept: INTERNAL MEDICINE | Facility: CLINIC | Age: 54
End: 2024-06-26

## 2024-07-01 ENCOUNTER — APPOINTMENT (OUTPATIENT)
Dept: INTERNAL MEDICINE | Facility: CLINIC | Age: 54
End: 2024-07-01

## 2024-10-25 NOTE — ED ADULT NURSE NOTE - SKIN TEMPERATURE MOISTURE
.Pt tolerated ferrlecit infusion well.  No adverse reaction noted.   IV flushed with NS and D/C per protocol.  Patient left clinic in no acute distress.    warm

## 2024-10-28 ENCOUNTER — EMERGENCY (EMERGENCY)
Facility: HOSPITAL | Age: 54
LOS: 1 days | Discharge: HOME | End: 2024-10-28
Attending: EMERGENCY MEDICINE
Payer: SELF-PAY

## 2024-10-28 VITALS
RESPIRATION RATE: 20 BRPM | DIASTOLIC BLOOD PRESSURE: 92 MMHG | SYSTOLIC BLOOD PRESSURE: 139 MMHG | WEIGHT: 125 LBS | TEMPERATURE: 99 F | HEART RATE: 96 BPM | OXYGEN SATURATION: 99 % | HEIGHT: 69 IN

## 2024-10-28 VITALS
RESPIRATION RATE: 18 BRPM | DIASTOLIC BLOOD PRESSURE: 92 MMHG | HEART RATE: 85 BPM | TEMPERATURE: 98 F | SYSTOLIC BLOOD PRESSURE: 146 MMHG | OXYGEN SATURATION: 98 %

## 2024-10-28 PROCEDURE — 99284 EMERGENCY DEPT VISIT MOD MDM: CPT

## 2024-10-28 PROCEDURE — 71046 X-RAY EXAM CHEST 2 VIEWS: CPT

## 2024-10-28 PROCEDURE — 71046 X-RAY EXAM CHEST 2 VIEWS: CPT | Mod: 26

## 2024-10-28 PROCEDURE — 99283 EMERGENCY DEPT VISIT LOW MDM: CPT | Mod: 25

## 2024-10-28 PROCEDURE — 93005 ELECTROCARDIOGRAM TRACING: CPT

## 2024-10-28 RX ORDER — LIDOCAINE 50 MG/G
1 CREAM TOPICAL ONCE
Refills: 0 | Status: COMPLETED | OUTPATIENT
Start: 2024-10-28 | End: 2024-10-28

## 2024-10-28 RX ORDER — ACETAMINOPHEN 325 MG
975 TABLET ORAL ONCE
Refills: 0 | Status: COMPLETED | OUTPATIENT
Start: 2024-10-28 | End: 2024-10-28

## 2024-10-28 RX ADMIN — Medication 975 MILLIGRAM(S): at 14:00

## 2024-10-28 RX ADMIN — LIDOCAINE 1 PATCH: 50 CREAM TOPICAL at 14:00

## 2024-10-28 RX ADMIN — Medication 600 MILLIGRAM(S): at 14:00

## 2024-10-28 NOTE — ED PROVIDER NOTE - OBJECTIVE STATEMENT
The patient is a 54 y/o M with past medical history of depression, etoh abuse, presenting with right sided chest/rib pain for the past 2 weeks. The patient is a 54 y/o M with past medical history of depression, etoh abuse, presenting with right sided chest/rib pain for the past 2 weeks. The onset occurred after patient hit this area against the top of a fence while he was playing basketball. Pain intermittent since that time, worse with taking a deep breath. Pain primarily located in the anterior chest but patient states it wraps around the lateral aspect of the right ribs as well. No dyspnea, fever, cough, abd pain, nausea, vomiting, diarrhea, or any other symptoms. No meds taken for pain prior to arrival. Of note, patient also pointing out a bump, apparent cyst on RUE near elbow, states has drained several times over past year, supposed to follow up with a dermatologist but has not yet done so, requesting dermatology referral for this.

## 2024-10-28 NOTE — ED ADULT NURSE NOTE - OBJECTIVE STATEMENT
54 y/o M with past medical history of depression, etoh abuse, presenting with right sided chest/rib pain for the past 2 weeks. pt is aox4 breathing even unlabored spontaneously, ambulating without assistance.

## 2024-10-28 NOTE — ED PROVIDER NOTE - CLINICAL SUMMARY MEDICAL DECISION MAKING FREE TEXT BOX
Never smoker PIETRO [employer] PGY2- patient here with R sided rib pain for the past 2 weeks after hitting this area against the top of a fence, pain worse with deep inspiration. Vitals with no significant abnormalities, patient not tachypneic, no signs of respiratory distress. Pain reproducible to R anterolateral chest wall over ribs; no bruising noted. will give pain meds, obtain CXR to eval for rib fracture. likely dc home with pain control, pcp and derm referral for apparent cyst to [place].

## 2024-10-28 NOTE — ED PROVIDER NOTE - NSFOLLOWUPINSTRUCTIONS_ED_ALL_ED_FT
To control your pain at home, you should take Ibuprofen 400 mg along with Tylenol 650mg-1000mg every 6 to 8 hours. Limit your maximum daily Tylenol from all sources to 4000mg. Be aware that many other medications contain acetaminophen which is also known as Tylenol. Taking Tylenol and Ibuprofen together has been shown to be more effective at relieving pain than taking them separately. These are both over the counter medications that you can  at your local pharmacy without a prescription. You need to respect all of the warnings on the bottles. You shouldn’t take these medications for more than a week without following up with your doctor. Both medications come with certain risks and side effects that you need to discuss with your doctor, especially if you are taking them for a prolonged period.    Use lidocaine patches as needed. These are also available over the counter without a prescription.    We sent a referral for primary care and for dermatology. Someone should contact you in 3-5 business days to schedule an appointment.

## 2024-10-28 NOTE — ED PROVIDER NOTE - ATTENDING CONTRIBUTION TO CARE
attending Alonso: pt with subacute compliant of R sided chest wall pain after hitting the area against a fence while playing basketball 2 weeks ago. Pain worse with movement, palpation and deep inspiration. Denies fever, cough, rash. Has not taken anything for his pain. Exam as above. Possible rib fx, superficial contusion. Will treat symptomatically, xrays, reassess

## 2024-10-28 NOTE — ED PROVIDER NOTE - PHYSICAL EXAMINATION
General: no acute distress  Psych: mood appropriate  Head: normocephalic; atraumatic  Eyes: conjunctivae clear bilaterally, sclerae anicteric  ENT: no nasal flaring, patent nares  Cardio: regular rate and rhythm; normal heart sounds  Resp: clear to auscultation bilaterally  GI: abdomen soft, nontender, nondistended  Neuro: A&Ox3, normal gait  Skin: no rashes or bruising noted; apparent 1cm cyst to R anterior proximal forearm with no active drainage  MSK: normal movement of extremities; tenderness to ribs over R lower anterolateral chest wall  Lymph/Vasc: no LE edema

## 2024-10-28 NOTE — ED PROVIDER NOTE - PATIENT PORTAL LINK FT
You can access the FollowMyHealth Patient Portal offered by [facility] by registering at the following website: [email or web address]. By joining [facility]’s FollowMyHealth portal, you will also be able to view your health information using other applications (apps) compatible with our system.

## 2025-01-09 ENCOUNTER — APPOINTMENT (OUTPATIENT)
Dept: DERMATOLOGY | Facility: HOSPITAL | Age: 55
End: 2025-01-09